# Patient Record
Sex: MALE | Race: WHITE | NOT HISPANIC OR LATINO | Employment: OTHER | URBAN - METROPOLITAN AREA
[De-identification: names, ages, dates, MRNs, and addresses within clinical notes are randomized per-mention and may not be internally consistent; named-entity substitution may affect disease eponyms.]

---

## 2020-11-11 ENCOUNTER — TELEPHONE (OUTPATIENT)
Dept: ORTHOPEDICS | Facility: CLINIC | Age: 80
End: 2020-11-11

## 2020-11-11 DIAGNOSIS — R52 PAIN: Primary | ICD-10-CM

## 2020-11-11 NOTE — TELEPHONE ENCOUNTER
Left patient a voicemail stating he needs to come in 1 hour early before his appointment to have a xray of his right hand on the first floor.If he has any questions he can reach the office at [phone].

## 2020-11-11 NOTE — TELEPHONE ENCOUNTER
Tried to call call patients emergency contact so patient could come in 1 hour early before his appointment tomorrow to have xray but the number is not in service.

## 2020-11-11 NOTE — TELEPHONE ENCOUNTER
Patient returned my phone call and he will come in 1 hour early before his appointment to have his xray done.

## 2020-11-12 ENCOUNTER — HOSPITAL ENCOUNTER (OUTPATIENT)
Dept: RADIOLOGY | Facility: OTHER | Age: 80
Discharge: HOME | End: 2020-11-12
Attending: ORTHOPAEDIC SURGERY
Payer: OTHER GOVERNMENT

## 2020-11-12 ENCOUNTER — OFFICE VISIT (OUTPATIENT)
Dept: ORTHOPEDICS | Facility: CLINIC | Age: 80
End: 2020-11-12
Payer: OTHER GOVERNMENT

## 2020-11-12 VITALS
HEART RATE: 68 BPM | WEIGHT: 175 LBS | DIASTOLIC BLOOD PRESSURE: 76 MMHG | HEIGHT: 63 IN | SYSTOLIC BLOOD PRESSURE: 129 MMHG | BODY MASS INDEX: 31.01 KG/M2

## 2020-11-12 DIAGNOSIS — R52 PAIN: ICD-10-CM

## 2020-11-12 DIAGNOSIS — M65.341 TRIGGER RING FINGER OF RIGHT HAND: Primary | ICD-10-CM

## 2020-11-12 DIAGNOSIS — M65.351 TRIGGER LITTLE FINGER OF RIGHT HAND: ICD-10-CM

## 2020-11-12 PROCEDURE — 73130 X-RAY EXAM OF HAND: CPT | Mod: 26,RT,, | Performed by: RADIOLOGY

## 2020-11-12 PROCEDURE — 20550 NJX 1 TENDON SHEATH/LIGAMENT: CPT | Mod: PBBFAC | Performed by: ORTHOPAEDIC SURGERY

## 2020-11-12 PROCEDURE — 73130 X-RAY EXAM OF HAND: CPT | Mod: TC,FY,RT

## 2020-11-12 PROCEDURE — 99203 OFFICE O/P NEW LOW 30 MIN: CPT | Mod: S$PBB,25,, | Performed by: ORTHOPAEDIC SURGERY

## 2020-11-12 PROCEDURE — 99999 PR PBB SHADOW E&M-EST. PATIENT-LVL III: CPT | Mod: PBBFAC,,, | Performed by: ORTHOPAEDIC SURGERY

## 2020-11-12 PROCEDURE — 99203 PR OFFICE/OUTPT VISIT, NEW, LEVL III, 30-44 MIN: ICD-10-PCS | Mod: S$PBB,25,, | Performed by: ORTHOPAEDIC SURGERY

## 2020-11-12 PROCEDURE — 99213 OFFICE O/P EST LOW 20 MIN: CPT | Mod: PBBFAC,25 | Performed by: ORTHOPAEDIC SURGERY

## 2020-11-12 PROCEDURE — 20550 TENDON SHEATH: R RING MCP: ICD-10-PCS | Mod: S$PBB,F8,, | Performed by: ORTHOPAEDIC SURGERY

## 2020-11-12 PROCEDURE — 73130 XR HAND COMPLETE 3 VIEW RIGHT: ICD-10-PCS | Mod: 26,RT,, | Performed by: RADIOLOGY

## 2020-11-12 PROCEDURE — 99999 PR PBB SHADOW E&M-EST. PATIENT-LVL III: ICD-10-PCS | Mod: PBBFAC,,, | Performed by: ORTHOPAEDIC SURGERY

## 2020-11-12 RX ORDER — ROSUVASTATIN CALCIUM 10 MG/1
TABLET, COATED ORAL
COMMUNITY
Start: 2020-05-08 | End: 2021-10-12

## 2020-11-12 RX ORDER — AMMONIUM LACTATE 12 G/100G
LOTION TOPICAL
COMMUNITY
Start: 2020-10-26

## 2020-11-12 RX ORDER — ALOGLIPTIN 12.5 MG/1
TABLET, FILM COATED ORAL
COMMUNITY
Start: 2020-05-05

## 2020-11-12 RX ORDER — DEXAMETHASONE SODIUM PHOSPHATE 4 MG/ML
4 INJECTION, SOLUTION INTRA-ARTICULAR; INTRALESIONAL; INTRAMUSCULAR; INTRAVENOUS; SOFT TISSUE
Status: DISCONTINUED | OUTPATIENT
Start: 2020-11-12 | End: 2020-11-12 | Stop reason: HOSPADM

## 2020-11-12 RX ORDER — INSULIN GLARGINE 100 [IU]/ML
INJECTION, SOLUTION SUBCUTANEOUS
COMMUNITY
Start: 2020-05-08

## 2020-11-12 RX ORDER — CALCIPOTRIENE 50 UG/G
CREAM TOPICAL
COMMUNITY
Start: 2020-10-26

## 2020-11-12 RX ORDER — FLUOROURACIL 50 MG/G
CREAM TOPICAL
COMMUNITY
Start: 2020-10-26

## 2020-11-12 RX ADMIN — DEXAMETHASONE SODIUM PHOSPHATE 4 MG: 4 INJECTION INTRA-ARTICULAR; INTRALESIONAL; INTRAMUSCULAR; INTRAVENOUS; SOFT TISSUE at 09:11

## 2020-11-12 NOTE — PROCEDURES
Tendon Sheath: R ring MCP    Date/Time: 11/12/2020 9:15 AM  Performed by: Angi Saleh MD  Authorized by: Angi Saleh MD     Consent Done?:  Yes (Verbal)  Indications:  Pain  Timeout: prior to procedure the correct patient, procedure, and site was verified    Prep: patient was prepped and draped in usual sterile fashion      Local anesthesia used?: Yes    Anesthesia:  Local infiltration  Local anesthetic:  Lidocaine 1% without epinephrine  Location:  Ring finger  Site:  R ring MCP  Needle size:  25 G  Approach:  Volar  Medications:  4 mg dexamethasone 4 mg/mL

## 2020-11-12 NOTE — PROGRESS NOTES
I have personally taken the history and examined this patient. I agree with the resident's note as stated above.  Plan for right ring finger trigger finger injection. Pt has almost a mallet finger on the ring as well- + nodule tendon  + DM  Pt cannot make a full fist

## 2020-11-12 NOTE — LETTER
November 12, 2020      Connie Mendoza MD  [address] [address]           [place] [address]  [address] [facility] [address]  [facility] [address]  Phone: [phone]          Patient: Syd Butterfield   MR Number: [id number]   YOB: 1940   Date of Visit: 11/12/2020       Dear Dr. Connie Mendoza:    Thank you for referring Syd Butterfield to me for evaluation. Attached you will find relevant portions of my assessment and plan of care.    If you have questions, please do not hesitate to call me. I look forward to following Syd Butterfield along with you.    Sincerely,    Angi Saleh MD    Enclosure  CC:  No Recipients    If you would like to receive this communication electronically, please contact [email or web address] or [phone] to request more information on [employer] Link access.    For providers and/or their staff who would like to refer a patient to [facility], please contact us through our one-stop-shop provider referral line, [facility], at [phone].    If you feel you have received this communication in error or would no longer like to receive these types of communications, please e-mail [email or web address]

## 2020-11-12 NOTE — PROGRESS NOTES
DATE: 11/12/2020  PATIENT: Syd Butterfield    CHIEF COMPLAINT: R ring and small finger triggering    HPI:  80M presents with R ring and small finger triggering.  Present for 6 months.  No injury.  Does not have pain.  Has not tried bracing, CSI, surgery.  Worst in AM.  Also has multiple finger deformities that are chronic and not painful.  R thumb IPJ does not move.    PMH: IDDM, psoriasis, HLD    PSH: none    FH: none pertinent    Social History     Socioeconomic History    Marital status:      Spouse name: Not on file    Number of children: Not on file    Years of education: Not on file    Highest education level: Not on file   Occupational History    Not on file   Social Needs    Financial resource strain: Not on file    Food insecurity     Worry: Not on file     Inability: Not on file    Transportation needs     Medical: Not on file     Non-medical: Not on file   Tobacco Use    Smoking status: Not on file   Substance and Sexual Activity    Alcohol use: Not on file    Drug use: Not on file    Sexual activity: Not on file   Lifestyle    Physical activity     Days per week: Not on file     Minutes per session: Not on file    Stress: Not on file   Relationships    Social connections     Talks on phone: Not on file     Gets together: Not on file     Attends Orthodox service: Not on file     Active member of club or organization: Not on file     Attends meetings of clubs or organizations: Not on file     Relationship status: Not on file   Other Topics Concern    Not on file   Social History Narrative    Not on file         Current Outpatient Medications:     alogliptin (NESINA) 12.5 mg Tab, TAKE ONE TABLET BY MOUTH ONCE DAILY FOR DIABETES, Disp: , Rfl:     ammonium lactate (LAC-HYDRIN) 12 % lotion, APPLY LOTION TOPICALLY TWICE A DAY AS NEEDED APPLY TO AFFECTED AREA, Disp: , Rfl:     calcipotriene (DOVONOX) 0.005 % cream, APPLY SMALL AMOUNT TOPICALLY TWICE A DAY FOR PSORIASIS  APPLY A SMALL  "AMOUNT WITH EFFUDEX CREAM TWICE EVERY DAY FOR 5-7 DAYS. START  IN 4 WEEKS. FOR PSORIASIS  APPLY A SMALL AMOUNT WITH EFFUDEX CREAM TWICE EVERY DAY FOR 5-7 DAYS. START   IN 4 WEEKS., Disp: , Rfl:     fluorouraciL (EFUDEX) 5 % cream, APPLY SMALL AMOUNT TOPICALLY TWICE A DAY APPLY TO TREATMENT AREA WITH CALCIPOTRIENE TWICE PER DAY FOR FIVE TO  SEVEN DAYS.  TAKE ONE TO TWO WEEKS OFF BEFORE TREATING ANOTHER AREA. START IN 4 WEEKS. APPLY TO TREATMENT AREA WITH CALCIPOTRIENE TWICE PER DAY FOR FIVE TO   SEVEN DAYS.  TAKE ONE TO TWO WEEKS OFF BEFORE TREATING ANOTHER AREA. START IN 4 WEEKS., Disp: , Rfl:     insulin glargine 100 units/mL (3mL) SubQ pen, INJECT 18 UNTIS SUBCUTANEOUSLY AT BEDTIME FOR DIABETES, Disp: , Rfl:     rosuvastatin (CRESTOR) 10 MG tablet, TAKE ONE-HALF TABLET BY MOUTH EVERY DAY FOR CHOLESTEROL, Disp: , Rfl:     Review of patient's allergies indicates:   Allergen Reactions    Aspirin      Other reaction(s): Ecchymosis    Atorvastatin      Other reaction(s): Muscle pain    Contrast media     Fish oil      Other reaction(s): Ecchymosis    Lisinopril      Other reaction(s): Low blood pressure    Lortab 5-325  [hydrocodone-acetaminophen]     Simvastatin      Other reaction(s): Muscle pain       REVIEW OF SYSTEMS:  Constitutional: negative for fevers  Musculoskeletal: negative for paresthesias    PHYSICAL EXAMINATION:    /76   Pulse 68   Ht 5' 3" (1.6 m)   Wt 79.4 kg (175 lb)   BMI 31.00 kg/m²     General: No acute distress.  Cardio: Regular rate.  Chest: No increased work of breathing.     MSK:  R hand exam:    No discoloration  No scars  No wounds  No swelling  No thenar atrophy  No interossei atrophy  Multiple enlarged finger joints    Palpable nodule over ring and small A1 pulleys with visible and palpable locking  No wrist effusion  Warm, well perfused    Fingers flex to distal palmar crease except ring and small finger  Thumb opposes to base of small finger  No pain with active wrist " flexion/extension    SILT and motor intact M/R/U  Radial pulse palpable  Jalen's test shows complete palmar arch    Tinel's test negative  Phalen's test negative  No Froment's sign  No Wartenberg's sign    FPL intact  FDP/FDS intact to all fingers    Grind test negative  Finkelstein's negative          IMAGING:     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    ASSESSMENT/PLAN:    80M with R ring and small finger TFs.  Discussed options.  He would like CSI.  Will do ring finger today since it is worst.  Only one CSI since DM not controlled.  Night bracing.  F/u 6 weeks for reevaluation.

## 2020-11-18 ENCOUNTER — LAB VISIT (OUTPATIENT)
Dept: PRIMARY CARE CLINIC | Facility: OTHER | Age: 80
End: 2020-11-18
Attending: INTERNAL MEDICINE
Payer: OTHER GOVERNMENT

## 2020-11-18 DIAGNOSIS — Z03.818 ENCOUNTER FOR OBSERVATION FOR SUSPECTED EXPOSURE TO OTHER BIOLOGICAL AGENTS RULED OUT: ICD-10-CM

## 2020-11-18 PROCEDURE — U0003 INFECTIOUS AGENT DETECTION BY NUCLEIC ACID (DNA OR RNA); SEVERE ACUTE RESPIRATORY SYNDROME CORONAVIRUS 2 (SARS-COV-2) (CORONAVIRUS DISEASE [COVID-19]), AMPLIFIED PROBE TECHNIQUE, MAKING USE OF HIGH THROUGHPUT TECHNOLOGIES AS DESCRIBED BY CMS-2020-01-R: HCPCS

## 2020-11-20 ENCOUNTER — TELEPHONE (OUTPATIENT)
Dept: ORTHOPEDICS | Facility: CLINIC | Age: 80
End: 2020-11-20

## 2020-11-20 NOTE — TELEPHONE ENCOUNTER
Left patient a voicemail letting the patient  know the provider is unavailable on 12/29/20.Please call the office back to reschedule at [phone].

## 2020-11-21 LAB — SARS-COV-2 RNA RESP QL NAA+PROBE: NORMAL

## 2020-12-29 ENCOUNTER — OFFICE VISIT (OUTPATIENT)
Dept: ORTHOPEDICS | Facility: CLINIC | Age: 80
End: 2020-12-29
Payer: OTHER GOVERNMENT

## 2020-12-29 VITALS
HEART RATE: 69 BPM | BODY MASS INDEX: 31.01 KG/M2 | DIASTOLIC BLOOD PRESSURE: 79 MMHG | WEIGHT: 175 LBS | SYSTOLIC BLOOD PRESSURE: 163 MMHG | HEIGHT: 63 IN

## 2020-12-29 DIAGNOSIS — M19.049 HAND ARTHRITIS: Primary | ICD-10-CM

## 2020-12-29 PROCEDURE — 99213 OFFICE O/P EST LOW 20 MIN: CPT | Mod: PBBFAC | Performed by: PHYSICIAN ASSISTANT

## 2020-12-29 PROCEDURE — 99999 PR PBB SHADOW E&M-EST. PATIENT-LVL III: CPT | Mod: PBBFAC,,, | Performed by: PHYSICIAN ASSISTANT

## 2020-12-29 PROCEDURE — 99213 PR OFFICE/OUTPT VISIT, EST, LEVL III, 20-29 MIN: ICD-10-PCS | Mod: S$PBB,,, | Performed by: PHYSICIAN ASSISTANT

## 2020-12-29 PROCEDURE — 99213 OFFICE O/P EST LOW 20 MIN: CPT | Mod: S$PBB,,, | Performed by: PHYSICIAN ASSISTANT

## 2020-12-29 PROCEDURE — 99999 PR PBB SHADOW E&M-EST. PATIENT-LVL III: ICD-10-PCS | Mod: PBBFAC,,, | Performed by: PHYSICIAN ASSISTANT

## 2020-12-29 NOTE — PROGRESS NOTES
DATE: 12/29/2020  PATIENT: Syd Butterfield    CHIEF COMPLAINT: R ring and small finger triggering    HPI:  80M presents with R ring and small finger triggering.  Present for 6 months.  No injury.  Does not have pain.  Has not tried bracing, CSI, surgery.  Worst in AM.  Also has multiple finger deformities that are chronic and not painful.  R thumb IPJ does not move.    12/29/20  Pt presents for follow up right small and ring trigger fingers. Injections performed at last visit. He reports resolution of triggering, denies pain. He states he lacks a few degrees of full flexion of the ring. He also reports he feels his  strength has decreased.     PMH: IDDM, psoriasis, HLD    PSH: none    FH: none pertinent    Social History     Socioeconomic History    Marital status:      Spouse name: Not on file    Number of children: Not on file    Years of education: Not on file    Highest education level: Not on file   Occupational History    Not on file   Social Needs    Financial resource strain: Not on file    Food insecurity     Worry: Not on file     Inability: Not on file    Transportation needs     Medical: Not on file     Non-medical: Not on file   Tobacco Use    Smoking status: Never Smoker    Smokeless tobacco: Never Used   Substance and Sexual Activity    Alcohol use: Not on file    Drug use: Not on file    Sexual activity: Not on file   Lifestyle    Physical activity     Days per week: Not on file     Minutes per session: Not on file    Stress: Not on file   Relationships    Social connections     Talks on phone: Not on file     Gets together: Not on file     Attends Yazdanism service: Not on file     Active member of club or organization: Not on file     Attends meetings of clubs or organizations: Not on file     Relationship status: Not on file   Other Topics Concern    Not on file   Social History Narrative    Not on file         Current Outpatient Medications:     alogliptin (NESINA) 12.5  "mg Tab, TAKE ONE TABLET BY MOUTH ONCE DAILY FOR DIABETES, Disp: , Rfl:     ammonium lactate (LAC-HYDRIN) 12 % lotion, APPLY LOTION TOPICALLY TWICE A DAY AS NEEDED APPLY TO AFFECTED AREA, Disp: , Rfl:     calcipotriene (DOVONOX) 0.005 % cream, APPLY SMALL AMOUNT TOPICALLY TWICE A DAY FOR PSORIASIS  APPLY A SMALL AMOUNT WITH EFFUDEX CREAM TWICE EVERY DAY FOR 5-7 DAYS. START  IN 4 WEEKS. FOR PSORIASIS  APPLY A SMALL AMOUNT WITH EFFUDEX CREAM TWICE EVERY DAY FOR 5-7 DAYS. START   IN 4 WEEKS., Disp: , Rfl:     fluorouraciL (EFUDEX) 5 % cream, APPLY SMALL AMOUNT TOPICALLY TWICE A DAY APPLY TO TREATMENT AREA WITH CALCIPOTRIENE TWICE PER DAY FOR FIVE TO  SEVEN DAYS.  TAKE ONE TO TWO WEEKS OFF BEFORE TREATING ANOTHER AREA. START IN 4 WEEKS. APPLY TO TREATMENT AREA WITH CALCIPOTRIENE TWICE PER DAY FOR FIVE TO   SEVEN DAYS.  TAKE ONE TO TWO WEEKS OFF BEFORE TREATING ANOTHER AREA. START IN 4 WEEKS., Disp: , Rfl:     insulin glargine 100 units/mL (3mL) SubQ pen, INJECT 18 UNTIS SUBCUTANEOUSLY AT BEDTIME FOR DIABETES, Disp: , Rfl:     rosuvastatin (CRESTOR) 10 MG tablet, TAKE ONE-HALF TABLET BY MOUTH EVERY DAY FOR CHOLESTEROL, Disp: , Rfl:     Review of patient's allergies indicates:   Allergen Reactions    Aspirin      Other reaction(s): Ecchymosis    Atorvastatin      Other reaction(s): Muscle pain    Contrast media     Fish oil      Other reaction(s): Ecchymosis    Lisinopril      Other reaction(s): Low blood pressure    Lortab 5-325  [hydrocodone-acetaminophen]     Simvastatin      Other reaction(s): Muscle pain       REVIEW OF SYSTEMS:  Constitutional: negative for fevers  Musculoskeletal: negative for paresthesias    PHYSICAL EXAMINATION:    BP (!) 163/79 (BP Location: Left arm, Patient Position: Sitting, BP Method: Large (Automatic))   Pulse 69   Ht 5' 3" (1.6 m)   Wt 79.4 kg (175 lb)   BMI 31.00 kg/m²     General: No acute distress.  Cardio: Regular rate.  Chest: No increased work of breathing.     MSK:  R " hand exam:  Good wrist/ finger motion. Palpable nodule over ring A1 pulley with no ttp or triggering. He lacks a few degrees of full PIP flexion, chronic extensor lag at the DIP. Palpable radial pulse. Normal capillary refill. NVI. No wounds.       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    ASSESSMENT/PLAN:    80M with R ring trigger finger improved s/p injection. He does still have slightly decreased full flexion. Discussed arthritis and its effect on ROM. Will order OT. RTC if needed.

## 2021-01-06 ENCOUNTER — CLINICAL SUPPORT (OUTPATIENT)
Dept: REHABILITATION | Facility: HOSPITAL | Age: 81
End: 2021-01-06
Payer: OTHER GOVERNMENT

## 2021-01-06 DIAGNOSIS — M19.049 HAND ARTHRITIS: ICD-10-CM

## 2021-01-06 PROCEDURE — 97165 OT EVAL LOW COMPLEX 30 MIN: CPT

## 2021-01-06 PROCEDURE — 97110 THERAPEUTIC EXERCISES: CPT

## 2021-05-12 ENCOUNTER — PATIENT MESSAGE (OUTPATIENT)
Dept: RESEARCH | Facility: HOSPITAL | Age: 81
End: 2021-05-12

## 2021-07-12 RX ORDER — INSULIN ASPART 100 [IU]/ML
INJECTION, SOLUTION INTRAVENOUS; SUBCUTANEOUS
COMMUNITY
Start: 2020-05-08

## 2021-07-12 RX ORDER — DICLOFENAC SODIUM 10 MG/G
GEL TOPICAL
COMMUNITY
Start: 2020-11-23

## 2021-07-12 RX ORDER — HYDROCHLOROTHIAZIDE 25 MG/1
TABLET ORAL
COMMUNITY
Start: 2020-11-23

## 2021-07-12 RX ORDER — METFORMIN HYDROCHLORIDE 1000 MG/1
TABLET ORAL
COMMUNITY
Start: 2020-11-23 | End: 2021-10-12

## 2021-07-14 ENCOUNTER — HOSPITAL ENCOUNTER (OUTPATIENT)
Dept: RADIOLOGY | Facility: CLINIC | Age: 81
Discharge: HOME | End: 2021-07-14
Attending: PODIATRIST
Payer: OTHER GOVERNMENT

## 2021-07-14 ENCOUNTER — OFFICE VISIT (OUTPATIENT)
Dept: PODIATRY | Facility: CLINIC | Age: 81
End: 2021-07-14
Payer: OTHER GOVERNMENT

## 2021-07-14 VITALS
RESPIRATION RATE: 16 BRPM | BODY MASS INDEX: 34.02 KG/M2 | OXYGEN SATURATION: 98 % | HEART RATE: 70 BPM | HEIGHT: 63 IN | WEIGHT: 192 LBS

## 2021-07-14 DIAGNOSIS — E11.42 DIABETIC POLYNEUROPATHY ASSOCIATED WITH TYPE 2 DIABETES MELLITUS: Primary | ICD-10-CM

## 2021-07-14 DIAGNOSIS — E11.610 CHARCOT'S ARTHROPATHY, DIABETIC: ICD-10-CM

## 2021-07-14 DIAGNOSIS — M79.671 RIGHT FOOT PAIN: ICD-10-CM

## 2021-07-14 DIAGNOSIS — M20.5X1 HALLUX LIMITUS OF RIGHT FOOT: ICD-10-CM

## 2021-07-14 PROCEDURE — 73630 XR FOOT COMPLETE 3 VIEW RIGHT: ICD-10-PCS | Mod: RT,S$GLB,, | Performed by: RADIOLOGY

## 2021-07-14 PROCEDURE — 73630 X-RAY EXAM OF FOOT: CPT | Mod: RT,S$GLB,, | Performed by: RADIOLOGY

## 2021-07-14 PROCEDURE — 99204 OFFICE O/P NEW MOD 45 MIN: CPT | Mod: S$GLB,,, | Performed by: PODIATRIST

## 2021-07-14 PROCEDURE — 99204 PR OFFICE/OUTPT VISIT, NEW, LEVL IV, 45-59 MIN: ICD-10-PCS | Mod: S$GLB,,, | Performed by: PODIATRIST

## 2021-10-12 ENCOUNTER — OFFICE VISIT (OUTPATIENT)
Dept: PODIATRY | Facility: CLINIC | Age: 81
End: 2021-10-12
Payer: OTHER GOVERNMENT

## 2021-10-12 VITALS — BODY MASS INDEX: 34.02 KG/M2 | WEIGHT: 192 LBS | RESPIRATION RATE: 16 BRPM | HEIGHT: 63 IN

## 2021-10-12 DIAGNOSIS — M21.41 PES PLANUS OF RIGHT FOOT: ICD-10-CM

## 2021-10-12 DIAGNOSIS — M20.5X1 HALLUX LIMITUS OF RIGHT FOOT: ICD-10-CM

## 2021-10-12 DIAGNOSIS — I87.2 VENOUS INSUFFICIENCY OF BOTH LOWER EXTREMITIES: ICD-10-CM

## 2021-10-12 DIAGNOSIS — E11.42 DIABETIC POLYNEUROPATHY ASSOCIATED WITH TYPE 2 DIABETES MELLITUS: Primary | ICD-10-CM

## 2021-10-12 DIAGNOSIS — E11.610 CHARCOT'S ARTHROPATHY, DIABETIC: ICD-10-CM

## 2021-10-12 DIAGNOSIS — M79.671 RIGHT FOOT PAIN: ICD-10-CM

## 2021-10-12 PROCEDURE — 99214 OFFICE O/P EST MOD 30 MIN: CPT | Mod: S$GLB,,, | Performed by: PODIATRIST

## 2021-10-12 PROCEDURE — 99214 PR OFFICE/OUTPT VISIT, EST, LEVL IV, 30-39 MIN: ICD-10-PCS | Mod: S$GLB,,, | Performed by: PODIATRIST

## 2022-04-19 ENCOUNTER — TELEPHONE (OUTPATIENT)
Dept: PODIATRY | Facility: CLINIC | Age: 82
End: 2022-04-19
Payer: OTHER GOVERNMENT

## 2022-04-27 ENCOUNTER — TELEPHONE (OUTPATIENT)
Dept: PODIATRY | Facility: CLINIC | Age: 82
End: 2022-04-27
Payer: OTHER GOVERNMENT

## 2022-04-27 NOTE — TELEPHONE ENCOUNTER
----- Message from Michael Nolan sent at 4/27/2022  4:50 PM CDT -----  Regarding: Appt  Contact: KAREN GARZA [[id number]]  Name of Who is Calling: KAREN GARZA [[id number]]      What is the request in detail: Would like to speak with staff in regards to an appointment next week, [facility]. Please advise      Can the clinic reply by [facility]: no      What Number to Call Back [facility] not in [facility]: [phone] (please leave message)

## 2022-04-27 NOTE — TELEPHONE ENCOUNTER
Staff tried to contact patient to inform him that a referral has not been sent to Podiatry.    Staff left a message on voice mail informing the patient to have the [facility] referral to [phone].

## 2022-04-28 ENCOUNTER — TELEPHONE (OUTPATIENT)
Dept: PODIATRY | Facility: CLINIC | Age: 82
End: 2022-04-28
Payer: OTHER GOVERNMENT

## 2022-04-28 NOTE — TELEPHONE ENCOUNTER
Spoke with patient to inform him that a referral from the [facility] was not received. Staff inform patient that he could call the [facility] and fax the referral over. Staff gave patient fax number for [facility] to [facility] paperwork over.    Patient communicated understanding and will reach out when he get more information.    Staff verbalized understanding.

## 2022-04-28 NOTE — TELEPHONE ENCOUNTER
Patient stated that [facility] the [facility] and they are processing the request.    Staff communicated understanding and will reach out to schedule appointment when the referral is received.    Patient verbalized understanding.        ----- Message from Lisa Yusuf sent at 4/28/2022 10:46 AM CDT -----  Regarding: Requesting a call back  Contact: KAREN AGRZA [[id number]]  Name of Who is Calling:KAREN GARZA [[id number]]          What is the request in detail: Pt states [facility] and states the requested was being processed. Please advise          Can the clinic reply by [facility]: No          What Number to [facility] Back [facility]:[phone]

## 2022-05-10 ENCOUNTER — OFFICE VISIT (OUTPATIENT)
Dept: PODIATRY | Facility: CLINIC | Age: 82
End: 2022-05-10
Payer: OTHER GOVERNMENT

## 2022-05-10 ENCOUNTER — APPOINTMENT (OUTPATIENT)
Dept: RADIOLOGY | Facility: OTHER | Age: 82
End: 2022-05-10
Attending: PODIATRIST
Payer: OTHER GOVERNMENT

## 2022-05-10 VITALS
BODY MASS INDEX: 34.02 KG/M2 | WEIGHT: 192 LBS | DIASTOLIC BLOOD PRESSURE: 78 MMHG | SYSTOLIC BLOOD PRESSURE: 159 MMHG | HEIGHT: 63 IN | HEART RATE: 72 BPM

## 2022-05-10 DIAGNOSIS — E11.610 CHARCOT'S ARTHROPATHY, DIABETIC: ICD-10-CM

## 2022-05-10 DIAGNOSIS — E11.42 DIABETIC POLYNEUROPATHY ASSOCIATED WITH TYPE 2 DIABETES MELLITUS: Primary | ICD-10-CM

## 2022-05-10 DIAGNOSIS — E11.42 DIABETIC POLYNEUROPATHY ASSOCIATED WITH TYPE 2 DIABETES MELLITUS: ICD-10-CM

## 2022-05-10 DIAGNOSIS — M20.12 VALGUS DEFORMITY OF BOTH GREAT TOES: ICD-10-CM

## 2022-05-10 DIAGNOSIS — M20.11 VALGUS DEFORMITY OF BOTH GREAT TOES: ICD-10-CM

## 2022-05-10 PROCEDURE — 99999 PR PBB SHADOW E&M-EST. PATIENT-LVL IV: ICD-10-PCS | Mod: PBBFAC,,, | Performed by: PODIATRIST

## 2022-05-10 PROCEDURE — 73630 XR FOOT COMPLETE 3 VIEW BILATERAL: ICD-10-PCS | Mod: 26,50,, | Performed by: RADIOLOGY

## 2022-05-10 PROCEDURE — 11721 DEBRIDE NAIL 6 OR MORE: CPT | Mod: S$PBB,,, | Performed by: PODIATRIST

## 2022-05-10 PROCEDURE — 99214 PR OFFICE/OUTPT VISIT, EST, LEVL IV, 30-39 MIN: ICD-10-PCS | Mod: 25,S$PBB,, | Performed by: PODIATRIST

## 2022-05-10 PROCEDURE — 11721 PR DEBRIDEMENT OF NAILS, 6 OR MORE: ICD-10-PCS | Mod: S$PBB,,, | Performed by: PODIATRIST

## 2022-05-10 PROCEDURE — 73630 X-RAY EXAM OF FOOT: CPT | Mod: 26,50,, | Performed by: RADIOLOGY

## 2022-05-10 PROCEDURE — 99214 OFFICE O/P EST MOD 30 MIN: CPT | Mod: 25,S$PBB,, | Performed by: PODIATRIST

## 2022-05-10 PROCEDURE — 99999 PR PBB SHADOW E&M-EST. PATIENT-LVL IV: CPT | Mod: PBBFAC,,, | Performed by: PODIATRIST

## 2022-05-10 PROCEDURE — 99214 OFFICE O/P EST MOD 30 MIN: CPT | Mod: PBBFAC,PN,25 | Performed by: PODIATRIST

## 2022-05-10 PROCEDURE — 73630 X-RAY EXAM OF FOOT: CPT | Mod: TC,50

## 2022-05-10 PROCEDURE — 11721 DEBRIDE NAIL 6 OR MORE: CPT | Mod: Q9,PBBFAC,PN | Performed by: PODIATRIST

## 2022-05-10 RX ORDER — CICLOPIROX 80 MG/ML
SOLUTION TOPICAL NIGHTLY
Qty: 6.6 ML | Refills: 11 | Status: SHIPPED | OUTPATIENT
Start: 2022-05-10

## 2022-05-10 NOTE — PROGRESS NOTES
Subjective:      Patient ID: Syd Butterfield Jr. is a 82 y.o. male.    Chief Complaint: Foot Problem (Charcot R foot)    Syd is a 82 y.o. male who presents to the clinic for evaluation and treatment of high risk feet. 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 The patient's chief complaint is long, thick toenails. Gradual onset, worsening over past several weeks, aggravated by increased weight bearing, shoe gear, pressure.  Periodic debridement help symptoms. .    [place] [facility] [facility] [place] [facility]    Date Last Seen by PCP:   Chief Complaint   Patient presents with    Foot Problem     Charcot R foot         Current shoe gear:  Affected Foot: Casual shoes     Unaffected Foot: Casual shoes    No results found for: HGBA1C    Review of Systems   Constitutional: Negative for chills, diaphoresis, fever, malaise/fatigue and night sweats.   Cardiovascular: Negative for claudication, cyanosis, leg swelling and syncope.   Skin: Positive for nail changes. Negative for color change, dry skin, rash, suspicious lesions and unusual hair distribution.   Musculoskeletal: Positive for joint pain and joint swelling. Negative for falls, muscle cramps, muscle weakness and stiffness.   Gastrointestinal: Negative for constipation, diarrhea, nausea and vomiting.   Neurological: Positive for numbness, paresthesias and sensory change. Negative for brief paralysis, disturbances in coordination, focal weakness and tremors.           Objective:      Physical Exam  Constitutional:        General: He is not in acute distress.     Appearance: He is well-developed. He is not diaphoretic.   Cardiovascular:      Pulses:           Popliteal pulses are 2+ on the right side and 2+ on the left side.        Dorsalis pedis pulses are 2+ on the right side and 2+ on the left side.        Posterior tibial pulses are 1+ on the right side and 1+ on the left side.      Comments: Capillary refill 3 seconds all toes/distal feet, all toes/both feet warm to touch.      Negative lymphadenopathy bilateral popliteal fossa and tarsal tunnel.      Negavie lower extremity edema bilateral.    Musculoskeletal:      Right ankle: No swelling, deformity, ecchymosis or lacerations. Normal range of motion. Normal pulse.      Right Achilles Tendon: Normal. No defects. Jacinto's test negative.      Comments: Mild hallux valgus partially reducible bilateral without symptoms today.    Patient has minimal plantar lateral midfoot rocker bottom on the right secondary to Charcot which is now quiescent.    Otherwise, Normal angle, base, station of gait. All ten toes without clubbing, cyanosis, or signs of ischemia.  No pain to palpation bilateral lower extremities.  Range of motion, stability, muscle strength, and muscle tone normal bilateral feet and legs.    Lymphadenopathy:      Lower Body: No right inguinal adenopathy. No left inguinal adenopathy.      Comments: Negative lymphadenopathy bilateral popliteal fossa and tarsal tunnel.    Negative lymphangitic streaking bilateral feet/ankles/legs.   Skin:     General: Skin is warm and dry.      Capillary Refill: Capillary refill takes 2 to 3 seconds.      Coloration: Skin is not pale.      Findings: No abrasion, bruising, burn, ecchymosis, erythema, laceration, lesion or rash.      Nails: There is no clubbing.      Comments:   Skin is normal age and health appropriate color, turgor, texture, and temperature bilateral lower extremities without ulceration, hyperpigmentation,  discoloration, masses nodules or cords palpated.  No ecchymosis, erythema, edema, or cardinal signs of infection bilateral lower extremities.    Toenails 1st, 2nd, 3rd, 4th, 5th  bilateral are hypertrophic thickened 2-3 mm, dystrophic, discolored tanish brown with tan, gray crumbly subungual debris.  Tender to distal nail plate pressure, without periungual skin abnormality of each.     Neurological:      Mental Status: He is alert and oriented to person, place, and time.      Sensory: Sensory deficit present.      Motor: No tremor, atrophy or abnormal muscle tone.      Gait: Gait normal.      Deep Tendon Reflexes:      Reflex Scores:       Patellar reflexes are 2+ on the right side and 2+ on the left side.       Achilles reflexes are 2+ on the right side and 2+ on the left side.     Comments: Negative tinel sign to percussion sural, superficial peroneal, deep peroneal, saphenous, and posterior tibial nerves right and left ankles and feet.    Paresthesias, and burning bilateral feet with no clearly identified trigger or source.    Decreased/absent vibratory sensation bilateral feet to 128Hz tuning fork.       Psychiatric:         Behavior: Behavior is cooperative.               Assessment:       Encounter Diagnoses   Name Primary?    Diabetic polyneuropathy associated with type 2 diabetes mellitus Yes    Charcot's arthropathy, diabetic     Valgus deformity of both great toes          Plan:       Syd was seen today for foot problem.    Diagnoses and all orders for this visit:    Diabetic polyneuropathy associated with type 2 diabetes mellitus  -     X-Ray Foot Complete Bilateral; Future  -     DIABETIC SHOES FOR HOME USE    Charcot's arthropathy, diabetic  -     X-Ray Foot Complete Bilateral; Future  -     DIABETIC SHOES FOR HOME USE    Valgus deformity of both great toes  -     X-Ray Foot Complete Bilateral; Future  -     DIABETIC SHOES FOR HOME USE      I counseled the patient on his conditions, their  implications and medical management.        - Shoe inspection. Diabetic Foot Education. Patient reminded of the importance of good nutrition and blood sugar control to help prevent podiatric complications of diabetes. Patient instructed on proper foot hygeine. We discussed wearing proper shoe gear, daily foot inspections, never walking without protective shoe gear, never putting sharp instruments to feet, routine podiatric visits at least annually.      - With patient's permission, nails were aggressively reduced and debrided x 10 to their soft tissue attachment mechanically and with electric , removing all offending nail and debris. Patient relates relief following the procedure. He will continue to monitor the areas daily, inspect his feet, wear protective shoe gear when ambulatory, moisturizer to maintain skin integrity and follow in this office p.r.n.      Discussed conservative treatment with shoes of adequate dimensions, material, and style to alleviate symptoms and delay or prevent surgical intervention.    Rx DM shoes, xray right, penlac  No follow-ups on file.

## 2024-04-11 ENCOUNTER — HOSPITAL ENCOUNTER (INPATIENT)
Facility: HOSPITAL | Age: 84
LOS: 2 days | Discharge: HOME | DRG: 328 | End: 2024-04-14
Attending: EMERGENCY MEDICINE | Admitting: HOSPITALIST
Payer: OTHER GOVERNMENT

## 2024-04-11 DIAGNOSIS — Z46.59 ENCOUNTER FOR NASOGASTRIC (NG) TUBE PLACEMENT: ICD-10-CM

## 2024-04-11 DIAGNOSIS — K44.9 HIATAL HERNIA: ICD-10-CM

## 2024-04-11 DIAGNOSIS — R11.2 NAUSEA AND VOMITING, UNSPECIFIED VOMITING TYPE: ICD-10-CM

## 2024-04-11 DIAGNOSIS — R07.9 CHEST PAIN: ICD-10-CM

## 2024-04-11 DIAGNOSIS — R53.1 GENERALIZED WEAKNESS: ICD-10-CM

## 2024-04-11 DIAGNOSIS — E86.0 DEHYDRATION: Primary | ICD-10-CM

## 2024-04-11 DIAGNOSIS — R11.2 INTRACTABLE NAUSEA AND VOMITING: ICD-10-CM

## 2024-04-11 DIAGNOSIS — R94.31 ABNORMAL EKG: ICD-10-CM

## 2024-04-11 LAB
ALBUMIN SERPL BCP-MCNC: 3.8 G/DL (ref 3.5–5.2)
ALP SERPL-CCNC: 84 U/L (ref 55–135)
ALT SERPL W/O P-5'-P-CCNC: 15 U/L (ref 10–44)
ANION GAP SERPL CALC-SCNC: 8 MMOL/L (ref 8–16)
AST SERPL-CCNC: 14 U/L (ref 10–40)
BASOPHILS # BLD AUTO: 0.12 K/UL (ref 0–0.2)
BASOPHILS NFR BLD: 0.7 % (ref 0–1.9)
BILIRUB SERPL-MCNC: 1 MG/DL (ref 0.1–1)
BUN SERPL-MCNC: 21 MG/DL (ref 8–23)
CALCIUM SERPL-MCNC: 9.3 MG/DL (ref 8.7–10.5)
CHLORIDE SERPL-SCNC: 100 MMOL/L (ref 95–110)
CO2 SERPL-SCNC: 34 MMOL/L (ref 23–29)
CREAT SERPL-MCNC: 1 MG/DL (ref 0.5–1.4)
DIFFERENTIAL METHOD BLD: ABNORMAL
EOSINOPHIL # BLD AUTO: 0 K/UL (ref 0–0.5)
EOSINOPHIL NFR BLD: 0.1 % (ref 0–8)
ERYTHROCYTE [DISTWIDTH] IN BLOOD BY AUTOMATED COUNT: 13.1 % (ref 11.5–14.5)
EST. GFR  (NO RACE VARIABLE): >60 ML/MIN/1.73 M^2
GLUCOSE SERPL-MCNC: 169 MG/DL (ref 70–110)
HCT VFR BLD AUTO: 40.2 % (ref 40–54)
HGB BLD-MCNC: 13.5 G/DL (ref 14–18)
IMM GRANULOCYTES # BLD AUTO: 0.1 K/UL (ref 0–0.04)
IMM GRANULOCYTES NFR BLD AUTO: 0.6 % (ref 0–0.5)
LACTATE SERPL-SCNC: 2.9 MMOL/L (ref 0.5–1.9)
LIPASE SERPL-CCNC: 6 U/L (ref 4–60)
LYMPHOCYTES # BLD AUTO: 1.2 K/UL (ref 1–4.8)
LYMPHOCYTES NFR BLD: 7 % (ref 18–48)
MAGNESIUM SERPL-MCNC: 1.7 MG/DL (ref 1.6–2.6)
MCH RBC QN AUTO: 29.9 PG (ref 27–31)
MCHC RBC AUTO-ENTMCNC: 33.6 G/DL (ref 32–36)
MCV RBC AUTO: 89 FL (ref 82–98)
MONOCYTES # BLD AUTO: 0.7 K/UL (ref 0.3–1)
MONOCYTES NFR BLD: 4 % (ref 4–15)
NEUTROPHILS # BLD AUTO: 15.2 K/UL (ref 1.8–7.7)
NEUTROPHILS NFR BLD: 87.6 % (ref 38–73)
NRBC BLD-RTO: 0 /100 WBC
PHOSPHATE SERPL-MCNC: 2.4 MG/DL (ref 2.7–4.5)
PLATELET # BLD AUTO: 350 K/UL (ref 150–450)
PMV BLD AUTO: 10.5 FL (ref 9.2–12.9)
POTASSIUM SERPL-SCNC: 3.7 MMOL/L (ref 3.5–5.1)
PROT SERPL-MCNC: 6.7 G/DL (ref 6–8.4)
RBC # BLD AUTO: 4.51 M/UL (ref 4.6–6.2)
SODIUM SERPL-SCNC: 142 MMOL/L (ref 136–145)
WBC # BLD AUTO: 17.3 K/UL (ref 3.9–12.7)

## 2024-04-11 PROCEDURE — 25000003 PHARM REV CODE 250: Performed by: EMERGENCY MEDICINE

## 2024-04-11 PROCEDURE — 99285 EMERGENCY DEPT VISIT HI MDM: CPT | Mod: 25

## 2024-04-11 PROCEDURE — 93005 ELECTROCARDIOGRAM TRACING: CPT | Performed by: GENERAL PRACTICE

## 2024-04-11 PROCEDURE — 63600175 PHARM REV CODE 636 W HCPCS

## 2024-04-11 PROCEDURE — 83735 ASSAY OF MAGNESIUM: CPT | Performed by: PHYSICIAN ASSISTANT

## 2024-04-11 PROCEDURE — 85025 COMPLETE CBC W/AUTO DIFF WBC: CPT | Performed by: PHYSICIAN ASSISTANT

## 2024-04-11 PROCEDURE — 83605 ASSAY OF LACTIC ACID: CPT | Performed by: EMERGENCY MEDICINE

## 2024-04-11 PROCEDURE — 96365 THER/PROPH/DIAG IV INF INIT: CPT

## 2024-04-11 PROCEDURE — 96366 THER/PROPH/DIAG IV INF ADDON: CPT

## 2024-04-11 PROCEDURE — 25000003 PHARM REV CODE 250

## 2024-04-11 PROCEDURE — 96367 TX/PROPH/DG ADDL SEQ IV INF: CPT

## 2024-04-11 PROCEDURE — 84100 ASSAY OF PHOSPHORUS: CPT | Performed by: PHYSICIAN ASSISTANT

## 2024-04-11 PROCEDURE — 96361 HYDRATE IV INFUSION ADD-ON: CPT

## 2024-04-11 PROCEDURE — 87040 BLOOD CULTURE FOR BACTERIA: CPT | Performed by: EMERGENCY MEDICINE

## 2024-04-11 PROCEDURE — 63600175 PHARM REV CODE 636 W HCPCS: Performed by: EMERGENCY MEDICINE

## 2024-04-11 PROCEDURE — 83690 ASSAY OF LIPASE: CPT | Performed by: PHYSICIAN ASSISTANT

## 2024-04-11 PROCEDURE — 80053 COMPREHEN METABOLIC PANEL: CPT | Performed by: PHYSICIAN ASSISTANT

## 2024-04-11 PROCEDURE — 96375 TX/PRO/DX INJ NEW DRUG ADDON: CPT

## 2024-04-11 PROCEDURE — 63600175 PHARM REV CODE 636 W HCPCS: Performed by: PHYSICIAN ASSISTANT

## 2024-04-11 PROCEDURE — 36415 COLL VENOUS BLD VENIPUNCTURE: CPT | Performed by: EMERGENCY MEDICINE

## 2024-04-11 PROCEDURE — 93010 ELECTROCARDIOGRAM REPORT: CPT | Mod: ,,, | Performed by: GENERAL PRACTICE

## 2024-04-11 RX ORDER — ONDANSETRON HYDROCHLORIDE 2 MG/ML
4 INJECTION, SOLUTION INTRAVENOUS
Status: COMPLETED | OUTPATIENT
Start: 2024-04-11 | End: 2024-04-11

## 2024-04-11 RX ADMIN — SODIUM CHLORIDE, POTASSIUM CHLORIDE, SODIUM LACTATE AND CALCIUM CHLORIDE 1000 ML: 600; 310; 30; 20 INJECTION, SOLUTION INTRAVENOUS at 10:04

## 2024-04-11 RX ADMIN — PIPERACILLIN SODIUM AND TAZOBACTAM SODIUM 4.5 G: 4; .5 INJECTION, POWDER, LYOPHILIZED, FOR SOLUTION INTRAVENOUS at 09:04

## 2024-04-11 RX ADMIN — PROMETHAZINE HYDROCHLORIDE 12.5 MG: 25 INJECTION INTRAMUSCULAR; INTRAVENOUS at 05:04

## 2024-04-11 RX ADMIN — ONDANSETRON 4 MG: 2 INJECTION INTRAMUSCULAR; INTRAVENOUS at 04:04

## 2024-04-11 NOTE — FIRST PROVIDER EVALUATION
Emergency Department TeleTriage Encounter Note      CHIEF COMPLAINT    Chief Complaint   Patient presents with    Vomiting    Nausea    Weakness     Pt has been n/v on and off since April 1st. Was given zofran but no longer helping       VITAL SIGNS   Initial Vitals [04/11/24 1547]   BP Pulse Resp Temp SpO2   (!) 163/80 75 18 98.1 °F (36.7 °C) 100 %      MAP       --            ALLERGIES    Review of patient's allergies indicates:   Allergen Reactions    Aspirin      Other reaction(s): Ecchymosis    Contrast media     Crestor [rosuvastatin] Other (See Comments)     Cramping in legs at night    Fish oil      Other reaction(s): Ecchymosis    Lipitor [atorvastatin]      Other reaction(s): Muscle pain    Lisinopril      Other reaction(s): Low blood pressure    Lortab 5-325  [hydrocodone-acetaminophen]     Simvastatin      Other reaction(s): Muscle pain       PROVIDER TRIAGE NOTE  This is a teletriage evaluation of a 84 y.o. male presenting to the ED complaining of nausea, vomiting, and generalized weakness.     Initial orders will be placed and care will be transferred to an alternate provider when patient is roomed for a full evaluation. Any additional orders and the final disposition will be determined by that provider.         ORDERS  Labs Reviewed   CBC W/ AUTO DIFFERENTIAL   COMPREHENSIVE METABOLIC PANEL   LIPASE   URINALYSIS, REFLEX TO URINE CULTURE   MAGNESIUM   PHOSPHORUS       ED Orders (720h ago, onward)      Start Ordered     Status Ordering Provider    04/11/24 1600 04/11/24 1559  Magnesium  STAT         Ordered ALLYSSA ABEL    04/11/24 1600 04/11/24 1559  Phosphorus  STAT         Ordered ALLYSSA ABEL    04/11/24 1600 04/11/24 1559  ondansetron injection 4 mg  ED 1 Time         Ordered ALLYSSA ABEL    04/11/24 1559 04/11/24 1559  Saline lock IV  Once         Ordered ALLYSSA ABEL    04/11/24 1559 04/11/24 1559  CBC auto differential  STAT          Ordered DIMITRIS-ALLYSSA BLOOM E.    04/11/24 1559 04/11/24 1559  Comprehensive metabolic panel  STAT         Ordered ALLYSSA ABEL E.    04/11/24 1559 04/11/24 1559  Lipase  STAT         Ordered ALLYSSA ABEL E.    04/11/24 1559 04/11/24 1559  EKG 12-lead  Once         Ordered ALLYSSA ABEL.    04/11/24 1559 04/11/24 1559  Urinalysis, Reflex to Urine Culture Urine, Clean Catch  STAT         Ordered ALLYSSA ABEL.              Virtual Visit Note: The provider triage portion of this emergency department evaluation and documentation was performed via [employer], a HIPAA-compliant telemedicine application, in concert with a tele-presenter in the room. A face to face patient evaluation with one of my colleagues will occur once the patient is placed in an emergency department room.      DISCLAIMER: This note was prepared with [employer] voice recognition transcription software. Garbled syntax, mangled pronouns, and other bizarre constructions may be attributed to that software system.

## 2024-04-11 NOTE — Clinical Note
Diagnosis: Intractable nausea and vomiting [474096]   Future Attending Provider: CROW CAMEJO [[id number]]   Place in Observation: [facility] [[id number]]

## 2024-04-12 ENCOUNTER — ANESTHESIA EVENT (OUTPATIENT)
Dept: SURGERY | Facility: HOSPITAL | Age: 84
DRG: 328 | End: 2024-04-12
Payer: OTHER GOVERNMENT

## 2024-04-12 PROBLEM — S61.401A OPEN WOUND OF RIGHT HAND: Status: ACTIVE | Noted: 2024-04-12

## 2024-04-12 PROBLEM — E11.9 DIABETES MELLITUS: Status: ACTIVE | Noted: 2024-04-12

## 2024-04-12 PROBLEM — E83.39 HYPOPHOSPHATEMIA: Status: ACTIVE | Noted: 2024-04-12

## 2024-04-12 PROBLEM — D72.829 LEUKOCYTOSIS: Status: ACTIVE | Noted: 2024-04-12

## 2024-04-12 PROBLEM — R11.2 INTRACTABLE NAUSEA AND VOMITING: Status: ACTIVE | Noted: 2024-04-12

## 2024-04-12 LAB
ALBUMIN SERPL BCP-MCNC: 3.3 G/DL (ref 3.5–5.2)
ALP SERPL-CCNC: 75 U/L (ref 55–135)
ALT SERPL W/O P-5'-P-CCNC: 12 U/L (ref 10–44)
ANION GAP SERPL CALC-SCNC: 8 MMOL/L (ref 8–16)
AST SERPL-CCNC: 16 U/L (ref 10–40)
BACTERIA #/AREA URNS HPF: NORMAL /HPF
BASOPHILS # BLD AUTO: 0.04 K/UL (ref 0–0.2)
BASOPHILS NFR BLD: 0.3 % (ref 0–1.9)
BILIRUB SERPL-MCNC: 0.7 MG/DL (ref 0.1–1)
BILIRUB UR QL STRIP: NEGATIVE
BUN SERPL-MCNC: 20 MG/DL (ref 8–23)
CALCIUM SERPL-MCNC: 8 MG/DL (ref 8.7–10.5)
CHLORIDE SERPL-SCNC: 104 MMOL/L (ref 95–110)
CLARITY UR: ABNORMAL
CO2 SERPL-SCNC: 30 MMOL/L (ref 23–29)
COLOR UR: YELLOW
CREAT SERPL-MCNC: 1 MG/DL (ref 0.5–1.4)
DIFFERENTIAL METHOD BLD: ABNORMAL
EOSINOPHIL # BLD AUTO: 0 K/UL (ref 0–0.5)
EOSINOPHIL NFR BLD: 0 % (ref 0–8)
ERYTHROCYTE [DISTWIDTH] IN BLOOD BY AUTOMATED COUNT: 13.2 % (ref 11.5–14.5)
EST. GFR  (NO RACE VARIABLE): >60 ML/MIN/1.73 M^2
GLUCOSE SERPL-MCNC: 238 MG/DL (ref 70–110)
GLUCOSE SERPL-MCNC: 249 MG/DL (ref 70–110)
GLUCOSE SERPL-MCNC: 270 MG/DL (ref 70–110)
GLUCOSE UR QL STRIP: ABNORMAL
HCT VFR BLD AUTO: 36.7 % (ref 40–54)
HGB BLD-MCNC: 12.1 G/DL (ref 14–18)
HGB UR QL STRIP: NEGATIVE
IMM GRANULOCYTES # BLD AUTO: 0.05 K/UL (ref 0–0.04)
IMM GRANULOCYTES NFR BLD AUTO: 0.3 % (ref 0–0.5)
INR PPP: 1 (ref 0.8–1.2)
KETONES UR QL STRIP: ABNORMAL
LACTATE SERPL-SCNC: 1 MMOL/L (ref 0.5–1.9)
LACTATE SERPL-SCNC: 1.8 MMOL/L (ref 0.5–1.9)
LEUKOCYTE ESTERASE UR QL STRIP: NEGATIVE
LYMPHOCYTES # BLD AUTO: 1 K/UL (ref 1–4.8)
LYMPHOCYTES NFR BLD: 6.3 % (ref 18–48)
MAGNESIUM SERPL-MCNC: 1.8 MG/DL (ref 1.6–2.6)
MCH RBC QN AUTO: 30.2 PG (ref 27–31)
MCHC RBC AUTO-ENTMCNC: 33 G/DL (ref 32–36)
MCV RBC AUTO: 92 FL (ref 82–98)
MICROSCOPIC COMMENT: NORMAL
MONOCYTES # BLD AUTO: 0.6 K/UL (ref 0.3–1)
MONOCYTES NFR BLD: 4 % (ref 4–15)
NEUTROPHILS # BLD AUTO: 14.1 K/UL (ref 1.8–7.7)
NEUTROPHILS NFR BLD: 89.1 % (ref 38–73)
NITRITE UR QL STRIP: NEGATIVE
NRBC BLD-RTO: 0 /100 WBC
PH UR STRIP: 8 [PH] (ref 5–8)
PLATELET # BLD AUTO: 293 K/UL (ref 150–450)
PMV BLD AUTO: 10.1 FL (ref 9.2–12.9)
POCT GLUCOSE: 217 MG/DL (ref 70–110)
POTASSIUM SERPL-SCNC: 4.5 MMOL/L (ref 3.5–5.1)
PROCALCITONIN SERPL IA-MCNC: 0.06 NG/ML (ref 0–0.5)
PROT SERPL-MCNC: 5.8 G/DL (ref 6–8.4)
PROT UR QL STRIP: ABNORMAL
PROTHROMBIN TIME: 11.1 SEC (ref 9–12.5)
RBC # BLD AUTO: 4.01 M/UL (ref 4.6–6.2)
SODIUM SERPL-SCNC: 142 MMOL/L (ref 136–145)
SP GR UR STRIP: 1.01 (ref 1–1.03)
TROPONIN I SERPL HS-MCNC: 4.3 PG/ML (ref 0–14.9)
URN SPEC COLLECT METH UR: ABNORMAL
UROBILINOGEN UR STRIP-ACNC: NEGATIVE EU/DL
WBC # BLD AUTO: 15.81 K/UL (ref 3.9–12.7)
YEAST URNS QL MICRO: NORMAL

## 2024-04-12 PROCEDURE — 11000001 HC ACUTE MED/SURG PRIVATE ROOM

## 2024-04-12 PROCEDURE — C9113 INJ PANTOPRAZOLE SODIUM, VIA: HCPCS | Performed by: INTERNAL MEDICINE

## 2024-04-12 PROCEDURE — 84145 PROCALCITONIN (PCT): CPT

## 2024-04-12 PROCEDURE — 63600175 PHARM REV CODE 636 W HCPCS

## 2024-04-12 PROCEDURE — 81001 URINALYSIS AUTO W/SCOPE: CPT | Performed by: PHYSICIAN ASSISTANT

## 2024-04-12 PROCEDURE — C9113 INJ PANTOPRAZOLE SODIUM, VIA: HCPCS

## 2024-04-12 PROCEDURE — 96376 TX/PRO/DX INJ SAME DRUG ADON: CPT

## 2024-04-12 PROCEDURE — 96375 TX/PRO/DX INJ NEW DRUG ADDON: CPT

## 2024-04-12 PROCEDURE — 80053 COMPREHEN METABOLIC PANEL: CPT

## 2024-04-12 PROCEDURE — 99223 1ST HOSP IP/OBS HIGH 75: CPT | Mod: 57,,, | Performed by: STUDENT IN AN ORGANIZED HEALTH CARE EDUCATION/TRAINING PROGRAM

## 2024-04-12 PROCEDURE — 63600175 PHARM REV CODE 636 W HCPCS: Performed by: HOSPITALIST

## 2024-04-12 PROCEDURE — 25000003 PHARM REV CODE 250: Performed by: HOSPITALIST

## 2024-04-12 PROCEDURE — 96365 THER/PROPH/DIAG IV INF INIT: CPT | Mod: 59

## 2024-04-12 PROCEDURE — 83605 ASSAY OF LACTIC ACID: CPT | Mod: 91

## 2024-04-12 PROCEDURE — 36415 COLL VENOUS BLD VENIPUNCTURE: CPT | Performed by: INTERNAL MEDICINE

## 2024-04-12 PROCEDURE — 93010 ELECTROCARDIOGRAM REPORT: CPT | Mod: ,,, | Performed by: INTERNAL MEDICINE

## 2024-04-12 PROCEDURE — 83735 ASSAY OF MAGNESIUM: CPT

## 2024-04-12 PROCEDURE — 83605 ASSAY OF LACTIC ACID: CPT | Performed by: INTERNAL MEDICINE

## 2024-04-12 PROCEDURE — 82962 GLUCOSE BLOOD TEST: CPT

## 2024-04-12 PROCEDURE — 86301 IMMUNOASSAY TUMOR CA 19-9: CPT | Performed by: INTERNAL MEDICINE

## 2024-04-12 PROCEDURE — 63600175 PHARM REV CODE 636 W HCPCS: Performed by: INTERNAL MEDICINE

## 2024-04-12 PROCEDURE — 85025 COMPLETE CBC W/AUTO DIFF WBC: CPT

## 2024-04-12 PROCEDURE — 85610 PROTHROMBIN TIME: CPT | Performed by: INTERNAL MEDICINE

## 2024-04-12 PROCEDURE — 93005 ELECTROCARDIOGRAM TRACING: CPT | Performed by: INTERNAL MEDICINE

## 2024-04-12 PROCEDURE — 84484 ASSAY OF TROPONIN QUANT: CPT

## 2024-04-12 PROCEDURE — 96367 TX/PROPH/DG ADDL SEQ IV INF: CPT

## 2024-04-12 PROCEDURE — 25000003 PHARM REV CODE 250

## 2024-04-12 RX ORDER — PANTOPRAZOLE SODIUM 40 MG/10ML
40 INJECTION, POWDER, LYOPHILIZED, FOR SOLUTION INTRAVENOUS 2 TIMES DAILY
Status: DISCONTINUED | OUTPATIENT
Start: 2024-04-12 | End: 2024-04-14 | Stop reason: HOSPADM

## 2024-04-12 RX ORDER — SODIUM CHLORIDE 9 MG/ML
INJECTION, SOLUTION INTRAVENOUS CONTINUOUS
Status: DISCONTINUED | OUTPATIENT
Start: 2024-04-12 | End: 2024-04-12

## 2024-04-12 RX ORDER — SODIUM CHLORIDE 0.9 % (FLUSH) 0.9 %
10 SYRINGE (ML) INJECTION EVERY 12 HOURS PRN
Status: DISCONTINUED | OUTPATIENT
Start: 2024-04-12 | End: 2024-04-14 | Stop reason: HOSPADM

## 2024-04-12 RX ORDER — IBUPROFEN 200 MG
16 TABLET ORAL
Status: DISCONTINUED | OUTPATIENT
Start: 2024-04-12 | End: 2024-04-14 | Stop reason: HOSPADM

## 2024-04-12 RX ORDER — TALC
6 POWDER (GRAM) TOPICAL NIGHTLY PRN
Status: DISCONTINUED | OUTPATIENT
Start: 2024-04-12 | End: 2024-04-14 | Stop reason: HOSPADM

## 2024-04-12 RX ORDER — MAGNESIUM SULFATE 1 G/100ML
1 INJECTION INTRAVENOUS ONCE
Status: COMPLETED | OUTPATIENT
Start: 2024-04-12 | End: 2024-04-12

## 2024-04-12 RX ORDER — PANTOPRAZOLE SODIUM 40 MG/10ML
40 INJECTION, POWDER, LYOPHILIZED, FOR SOLUTION INTRAVENOUS ONCE
Status: COMPLETED | OUTPATIENT
Start: 2024-04-12 | End: 2024-04-12

## 2024-04-12 RX ORDER — NALOXONE HCL 0.4 MG/ML
0.02 VIAL (ML) INJECTION
Status: DISCONTINUED | OUTPATIENT
Start: 2024-04-12 | End: 2024-04-14 | Stop reason: HOSPADM

## 2024-04-12 RX ORDER — ALUMINUM HYDROXIDE, MAGNESIUM HYDROXIDE, AND SIMETHICONE 1200; 120; 1200 MG/30ML; MG/30ML; MG/30ML
30 SUSPENSION ORAL EVERY 6 HOURS PRN
Status: DISCONTINUED | OUTPATIENT
Start: 2024-04-12 | End: 2024-04-12

## 2024-04-12 RX ORDER — SODIUM CHLORIDE AND POTASSIUM CHLORIDE 150; 900 MG/100ML; MG/100ML
INJECTION, SOLUTION INTRAVENOUS CONTINUOUS
Status: DISCONTINUED | OUTPATIENT
Start: 2024-04-12 | End: 2024-04-14 | Stop reason: HOSPADM

## 2024-04-12 RX ORDER — MUPIROCIN 20 MG/G
OINTMENT TOPICAL 2 TIMES DAILY
Status: DISCONTINUED | OUTPATIENT
Start: 2024-04-12 | End: 2024-04-14 | Stop reason: HOSPADM

## 2024-04-12 RX ORDER — GLUCAGON 1 MG
1 KIT INJECTION
Status: DISCONTINUED | OUTPATIENT
Start: 2024-04-12 | End: 2024-04-14 | Stop reason: HOSPADM

## 2024-04-12 RX ORDER — ONDANSETRON HYDROCHLORIDE 2 MG/ML
4 INJECTION, SOLUTION INTRAVENOUS EVERY 4 HOURS PRN
Status: DISCONTINUED | OUTPATIENT
Start: 2024-04-12 | End: 2024-04-13

## 2024-04-12 RX ORDER — IBUPROFEN 200 MG
24 TABLET ORAL
Status: DISCONTINUED | OUTPATIENT
Start: 2024-04-12 | End: 2024-04-14 | Stop reason: HOSPADM

## 2024-04-12 RX ORDER — LIDOCAINE HYDROCHLORIDE 20 MG/ML
15 SOLUTION OROPHARYNGEAL ONCE
Status: DISCONTINUED | OUTPATIENT
Start: 2024-04-12 | End: 2024-04-12

## 2024-04-12 RX ORDER — INSULIN ASPART 100 [IU]/ML
0-5 INJECTION, SOLUTION INTRAVENOUS; SUBCUTANEOUS EVERY 6 HOURS
Status: DISCONTINUED | OUTPATIENT
Start: 2024-04-12 | End: 2024-04-14 | Stop reason: HOSPADM

## 2024-04-12 RX ADMIN — MAGNESIUM SULFATE HEPTAHYDRATE 1 G: 1 INJECTION, SOLUTION INTRAVENOUS at 01:04

## 2024-04-12 RX ADMIN — PANTOPRAZOLE SODIUM 40 MG: 40 INJECTION, POWDER, LYOPHILIZED, FOR SOLUTION INTRAVENOUS at 01:04

## 2024-04-12 RX ADMIN — SODIUM PHOSPHATE, MONOBASIC, MONOHYDRATE AND SODIUM PHOSPHATE, DIBASIC, ANHYDROUS 15 MMOL: 142; 276 INJECTION, SOLUTION INTRAVENOUS at 05:04

## 2024-04-12 RX ADMIN — PIPERACILLIN AND TAZOBACTAM 3.38 G: 3; .375 INJECTION, POWDER, LYOPHILIZED, FOR SOLUTION INTRAVENOUS; PARENTERAL at 07:04

## 2024-04-12 RX ADMIN — SODIUM CHLORIDE AND POTASSIUM CHLORIDE: .9; .15 SOLUTION INTRAVENOUS at 11:04

## 2024-04-12 RX ADMIN — PANTOPRAZOLE SODIUM 40 MG: 40 INJECTION, POWDER, FOR SOLUTION INTRAVENOUS at 05:04

## 2024-04-12 RX ADMIN — PANTOPRAZOLE SODIUM 40 MG: 40 INJECTION, POWDER, LYOPHILIZED, FOR SOLUTION INTRAVENOUS at 09:04

## 2024-04-12 RX ADMIN — MUPIROCIN 1 G: 20 OINTMENT TOPICAL at 09:04

## 2024-04-12 RX ADMIN — ONDANSETRON 4 MG: 2 INJECTION INTRAMUSCULAR; INTRAVENOUS at 01:04

## 2024-04-12 RX ADMIN — PIPERACILLIN SODIUM AND TAZOBACTAM SODIUM 3.38 G: 3; .375 INJECTION, POWDER, LYOPHILIZED, FOR SOLUTION INTRAVENOUS at 09:04

## 2024-04-12 RX ADMIN — SODIUM CHLORIDE AND POTASSIUM CHLORIDE: .9; .15 SOLUTION INTRAVENOUS at 01:04

## 2024-04-12 RX ADMIN — INSULIN ASPART 2 UNITS: 100 INJECTION, SOLUTION INTRAVENOUS; SUBCUTANEOUS at 11:04

## 2024-04-12 NOTE — SUBJECTIVE & OBJECTIVE
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/2012    Mixed hyperlipidemia 04/16/2018    Sensorineural hearing loss (SNHL) of both ears 07/26/2018    Type 2 diabetes mellitus 04/07/2016    Vitamin B12 deficiency (non anemic) 09/28/2015    Vitamin D deficiency 04/16/2018       Past Surgical History:   Procedure Laterality Date    EYE SURGERY Right     HEMORRHAGE       Review of patient's allergies indicates:   Allergen Reactions    Aspirin      Other reaction(s): Ecchymosis    Contrast media     Crestor [rosuvastatin] Other (See Comments)     Cramping in legs at night    Fish oil      Other reaction(s): Ecchymosis    Lipitor [atorvastatin]      Other reaction(s): Muscle pain    Lisinopril      Other reaction(s): Low blood pressure    Lortab 5-325  [hydrocodone-acetaminophen]     Simvastatin      Other reaction(s): Muscle pain       No current facility-administered medications on file prior to encounter.     Current Outpatient Medications on File Prior to Encounter   Medication Sig    alogliptin (NESINA) 12.5 mg Tab TAKE ONE TABLET BY MOUTH ONCE DAILY FOR DIABETES    ammonium lactate (LAC-HYDRIN) 12 % lotion APPLY LOTION TOPICALLY TWICE A DAY AS NEEDED APPLY TO AFFECTED AREA    blood sugar diagnostic Strp USE 1 STRIP FOR TESTING SUBCUTANEOUSLY 6 TIMES EVERY DAY TO CHECK BLOOD SUGARS    calcipotriene (DOVONOX) 0.005 % cream APPLY SMALL AMOUNT TOPICALLY TWICE A DAY FOR PSORIASIS  APPLY A SMALL AMOUNT WITH EFFUDEX CREAM TWICE EVERY DAY FOR 5-7 DAYS. START  IN 4 WEEKS. FOR PSORIASIS  APPLY A SMALL AMOUNT WITH EFFUDEX CREAM TWICE EVERY DAY FOR 5-7 DAYS. START   IN 4 WEEKS.    ciclopirox (PENLAC) 8 % Soln Apply topically nightly.    diclofenac sodium (VOLTAREN) 1 % Gel APPLY 2  "GRAMS TOPICALLY FOUR TIMES A DAY AS NEEDED FOR PAIN AND INFLAMMATION. USE ENCLOSED DOSING CARD. TO FOOT    fluorouraciL (EFUDEX) 5 % cream APPLY SMALL AMOUNT TOPICALLY TWICE A DAY APPLY TO TREATMENT AREA WITH CALCIPOTRIENE TWICE PER DAY FOR FIVE TO  SEVEN DAYS.  TAKE ONE TO TWO WEEKS OFF BEFORE TREATING ANOTHER AREA. START IN 4 WEEKS. APPLY TO TREATMENT AREA WITH CALCIPOTRIENE TWICE PER DAY FOR FIVE TO   SEVEN DAYS.  TAKE ONE TO TWO WEEKS OFF BEFORE TREATING ANOTHER AREA. START IN 4 WEEKS.    hydroCHLOROthiazide (HYDRODIURIL) 25 MG tablet TAKE ONE-HALF TABLET BY MOUTH EVERY DAY AS A DIURETIC OR "WATER PILL"    insulin aspart U-100 (NOVOLOG) 100 unit/mL injection INJECT 8 UNTIS SUBCUTANEOUSLY EVERY MORNING FOR 30 DAYS, THEN INJECT 10 UNITS NOON FOR 30 DAYS, THEN INJECT 10 UNITS EVERY EVENING FOR 30 DAYS FOR DIABETES    insulin glargine 100 units/mL (3mL) SubQ pen INJECT 18 UNTIS SUBCUTANEOUSLY AT BEDTIME FOR DIABETES     Family History    None       Tobacco Use    Smoking status: Never    Smokeless tobacco: Former     Quit date: 1981   Substance and Sexual Activity    Alcohol use: Not on file    Drug use: Not on file    Sexual activity: Not on file     Review of Systems   Constitutional:  Positive for appetite change and chills.   Respiratory:  Negative for shortness of breath.    Cardiovascular:  Negative for chest pain and leg swelling.   Gastrointestinal:  Positive for abdominal pain, nausea and vomiting. Negative for blood in stool and diarrhea.   Genitourinary:  Negative for flank pain.   Skin:  Positive for wound.     Objective:     Vital Signs (Most Recent):  Temp: 97.5 °F (36.4 °C) (04/11/24 1729)  Pulse: 100 (04/12/24 0030)  Resp: 19 (04/12/24 0030)  BP: (!) 178/96 (04/12/24 0030)  SpO2: 96 % (04/12/24 0030) Vital Signs (24h Range):  Temp:  [97.5 °F (36.4 °C)-98.1 °F (36.7 °C)] 97.5 °F (36.4 °C)  Pulse:  [] 100  Resp:  [12-20] 19  SpO2:  [96 %-100 %] 96 %  BP: (153-217)/(71-99) 178/96     Weight: 72.6 " "kg (160 lb)  Body mass index is 23.63 kg/m².     Physical Exam  Vitals reviewed.   Constitutional:       General: He is in acute distress.      Appearance: He is ill-appearing.   HENT:      Head: Normocephalic and atraumatic.      Mouth/Throat:      Mouth: Mucous membranes are moist.   Eyes:      Conjunctiva/sclera: Conjunctivae normal.   Cardiovascular:      Rate and Rhythm: Normal rate and regular rhythm.   Pulmonary:      Effort: Pulmonary effort is normal. No respiratory distress.      Breath sounds: Normal breath sounds.   Abdominal:      General: Bowel sounds are normal. There is no distension.      Palpations: Abdomen is soft.      Tenderness: There is abdominal tenderness.   Musculoskeletal:         General: Normal range of motion.      Cervical back: Normal range of motion and neck supple.      Right lower leg: No edema.      Left lower leg: No edema.   Skin:     General: Skin is warm and dry.      Comments: 1cm abrasion with erythema and honey colored crusting to base of R thumb palmar surface, liquid bandaid on superficial surface   Neurological:      Mental Status: He is alert and oriented to person, place, and time.   Psychiatric:         Mood and Affect: Mood normal.                Significant Labs: All pertinent labs within the past 24 hours have been reviewed.  Bilirubin:   Recent Labs   Lab 04/11/24  1645   BILITOT 1.0     Blood Culture: No results for input(s): "LABBLOO" in the last 48 hours.  CBC:   Recent Labs   Lab 04/11/24  1645   WBC 17.30*   HGB 13.5*   HCT 40.2        CMP:   Recent Labs   Lab 04/11/24  1645      K 3.7      CO2 34*   *   BUN 21   CREATININE 1.0   CALCIUM 9.3   PROT 6.7   ALBUMIN 3.8   BILITOT 1.0   ALKPHOS 84   AST 14   ALT 15   ANIONGAP 8     Cardiac Markers: No results for input(s): "CKMB", "MYOGLOBIN", "BNP", "TROPISTAT" in the last 48 hours.  Lactic Acid:   Recent Labs   Lab 04/11/24  2031   LACTATE 2.9*     Lipase:   Recent Labs   Lab " "04/11/24  1645   LIPASE 6     Magnesium:   Recent Labs   Lab 04/11/24  1645   MG 1.7     Troponin: No results for input(s): "TROPONINI", "TROPONINIHS" in the last 48 hours.  Urine Studies: No results for input(s): "COLORU", "APPEARANCEUA", "PHUR", "SPECGRAV", "PROTEINUA", "GLUCUA", "KETONESU", "BILIRUBINUA", "OCCULTUA", "NITRITE", "UROBILINOGEN", "LEUKOCYTESUR", "RBCUA", "WBCUA", "BACTERIA", "SQUAMEPITHEL", "HYALINECASTS" in the last 48 hours.    Invalid input(s): "WRIGHTSUR"    Significant Imaging: I have reviewed all pertinent imaging results/findings within the past 24 hours.  "

## 2024-04-12 NOTE — H&P
[facility] - Emergency Dept  Hospital Medicine  History & Physical    Patient Name: Syd Butterfield Jr.  MRN: [id number]  Patient Class: OP- Observation  Admission Date: 4/11/2024  Attending Physician: Jeff Calix MD   Primary Care Provider: Connie Mendoza MD         Patient information was obtained from patient, past medical records, and ER records.     Subjective:     Principal Problem:Intractable nausea and vomiting    Chief Complaint:   Chief Complaint   Patient presents with    Vomiting    Nausea    Weakness     Pt has been n/v on and off since April 1st. Was given zofran but no longer helping        HPI: 84-year-old male presented to ED for eval of intractable NV and abd pain. Patient reported he has had NV and epigastric pain for several weeks, with inability to tolerate PO. Reported this is his third ED visit in 2 weeks. On 4/1/24 CT abd/pelvis at [place] [facility] with large paraesophageal hernia, constipation, nonobstructing right intrarenal calculus measuring approximately 6 mm. Patient reported he was instructed to follow up with GI for the hernia but [facility] and [facility] told to go to the ED. Patient does also report many years ago he did have EGD with dil. Reported he has been taking zofran at home without relief. He also was started on Ozempic about 3 months ago and he says he is stopped taking it about 6 weeks ago or more. On my exam, patient experienced forceful retching with copious amounts of brown bile. He denies coffee ground emesis, denies hematochezia or melena. Reports intermittent soft and hard bowel movements. Patient noted to lave leukocytosis and elevated lactic acid level in ED. Patient does report chills at home. Given zosyn and IVF. Lipase unremarkable. Given previous imaging with constipation and intrarenal calculus, concern for infection, and severity of symptoms will repeat CT abd/pelvis. Admit to hospital medicine for  further eval.     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/2012    Mixed hyperlipidemia 04/16/2018    Sensorineural hearing loss (SNHL) of both ears 07/26/2018    Type 2 diabetes mellitus 04/07/2016    Vitamin B12 deficiency (non anemic) 09/28/2015    Vitamin D deficiency 04/16/2018       Past Surgical History:   Procedure Laterality Date    EYE SURGERY Right     HEMORRHAGE       Review of patient's allergies indicates:   Allergen Reactions    Aspirin      Other reaction(s): Ecchymosis    Contrast media     Crestor [rosuvastatin] Other (See Comments)     Cramping in legs at night    Fish oil      Other reaction(s): Ecchymosis    Lipitor [atorvastatin]      Other reaction(s): Muscle pain    Lisinopril      Other reaction(s): Low blood pressure    Lortab 5-325  [hydrocodone-acetaminophen]     Simvastatin      Other reaction(s): Muscle pain       No current facility-administered medications on file prior to encounter.     Current Outpatient Medications on File Prior to Encounter   Medication Sig    alogliptin (NESINA) 12.5 mg Tab TAKE ONE TABLET BY MOUTH ONCE DAILY FOR DIABETES    ammonium lactate (LAC-HYDRIN) 12 % lotion APPLY LOTION TOPICALLY TWICE A DAY AS NEEDED APPLY TO AFFECTED AREA    blood sugar diagnostic Strp USE 1 STRIP FOR TESTING SUBCUTANEOUSLY 6 TIMES EVERY DAY TO CHECK BLOOD SUGARS    calcipotriene (DOVONOX) 0.005 % cream APPLY SMALL AMOUNT TOPICALLY TWICE A DAY FOR PSORIASIS  APPLY A SMALL AMOUNT WITH EFFUDEX CREAM TWICE EVERY DAY FOR 5-7 DAYS. START  IN 4 WEEKS. FOR PSORIASIS  APPLY A SMALL AMOUNT WITH EFFUDEX CREAM TWICE EVERY DAY FOR 5-7 DAYS. START   IN 4 WEEKS.    ciclopirox (PENLAC) 8 % Soln Apply topically nightly.    diclofenac sodium (VOLTAREN)  "1 % Gel APPLY 2 GRAMS TOPICALLY FOUR TIMES A DAY AS NEEDED FOR PAIN AND INFLAMMATION. USE ENCLOSED DOSING CARD. TO FOOT    fluorouraciL (EFUDEX) 5 % cream APPLY SMALL AMOUNT TOPICALLY TWICE A DAY APPLY TO TREATMENT AREA WITH CALCIPOTRIENE TWICE PER DAY FOR FIVE TO  SEVEN DAYS.  TAKE ONE TO TWO WEEKS OFF BEFORE TREATING ANOTHER AREA. START IN 4 WEEKS. APPLY TO TREATMENT AREA WITH CALCIPOTRIENE TWICE PER DAY FOR FIVE TO   SEVEN DAYS.  TAKE ONE TO TWO WEEKS OFF BEFORE TREATING ANOTHER AREA. START IN 4 WEEKS.    hydroCHLOROthiazide (HYDRODIURIL) 25 MG tablet TAKE ONE-HALF TABLET BY MOUTH EVERY DAY AS A DIURETIC OR "WATER PILL"    insulin aspart U-100 (NOVOLOG) 100 unit/mL injection INJECT 8 UNTIS SUBCUTANEOUSLY EVERY MORNING FOR 30 DAYS, THEN INJECT 10 UNITS NOON FOR 30 DAYS, THEN INJECT 10 UNITS EVERY EVENING FOR 30 DAYS FOR DIABETES    insulin glargine 100 units/mL (3mL) SubQ pen INJECT 18 UNTIS SUBCUTANEOUSLY AT BEDTIME FOR DIABETES     Family History    None       Tobacco Use    Smoking status: Never    Smokeless tobacco: Former     Quit date: 1981   Substance and Sexual Activity    Alcohol use: Not on file    Drug use: Not on file    Sexual activity: Not on file     Review of Systems   Constitutional:  Positive for appetite change and chills.   Respiratory:  Negative for shortness of breath.    Cardiovascular:  Negative for chest pain and leg swelling.   Gastrointestinal:  Positive for abdominal pain, nausea and vomiting. Negative for blood in stool and diarrhea.   Genitourinary:  Negative for flank pain.   Skin:  Positive for wound.     Objective:     Vital Signs (Most Recent):  Temp: 97.5 °F (36.4 °C) (04/11/24 1729)  Pulse: 100 (04/12/24 0030)  Resp: 19 (04/12/24 0030)  BP: (!) 178/96 (04/12/24 0030)  SpO2: 96 % (04/12/24 0030) Vital Signs (24h Range):  Temp:  [97.5 °F (36.4 °C)-98.1 °F (36.7 °C)] 97.5 °F (36.4 °C)  Pulse:  [] 100  Resp:  [12-20] 19  SpO2:  [96 %-100 %] 96 %  BP: (153-217)/(71-99) 178/96 " "    Weight: 72.6 kg (160 lb)  Body mass index is 23.63 kg/m².     Physical Exam  Vitals reviewed.   Constitutional:       General: He is in acute distress.      Appearance: He is ill-appearing.   HENT:      Head: Normocephalic and atraumatic.      Mouth/Throat:      Mouth: Mucous membranes are moist.   Eyes:      Conjunctiva/sclera: Conjunctivae normal.   Cardiovascular:      Rate and Rhythm: Normal rate and regular rhythm.   Pulmonary:      Effort: Pulmonary effort is normal. No respiratory distress.      Breath sounds: Normal breath sounds.   Abdominal:      General: Bowel sounds are normal. There is no distension.      Palpations: Abdomen is soft.      Tenderness: There is abdominal tenderness.   Musculoskeletal:         General: Normal range of motion.      Cervical back: Normal range of motion and neck supple.      Right lower leg: No edema.      Left lower leg: No edema.   Skin:     General: Skin is warm and dry.      Comments: 1cm abrasion with erythema and honey colored crusting to base of R thumb palmar surface, liquid bandaid on superficial surface   Neurological:      Mental Status: He is alert and oriented to person, place, and time.   Psychiatric:         Mood and Affect: Mood normal.                Significant Labs: All pertinent labs within the past 24 hours have been reviewed.  Bilirubin:   Recent Labs   Lab 04/11/24  1645   BILITOT 1.0     Blood Culture: No results for input(s): "LABBLOO" in the last 48 hours.  CBC:   Recent Labs   Lab 04/11/24  1645   WBC 17.30*   HGB 13.5*   HCT 40.2        CMP:   Recent Labs   Lab 04/11/24  1645      K 3.7      CO2 34*   *   BUN 21   CREATININE 1.0   CALCIUM 9.3   PROT 6.7   ALBUMIN 3.8   BILITOT 1.0   ALKPHOS 84   AST 14   ALT 15   ANIONGAP 8     Cardiac Markers: No results for input(s): "CKMB", "MYOGLOBIN", "BNP", "TROPISTAT" in the last 48 hours.  Lactic Acid:   Recent Labs   Lab 04/11/24  2031   LACTATE 2.9*     Lipase:   Recent Labs " "  Lab 04/11/24  1645   LIPASE 6     Magnesium:   Recent Labs   Lab 04/11/24  1645   MG 1.7     Troponin: No results for input(s): "TROPONINI", "TROPONINIHS" in the last 48 hours.  Urine Studies: No results for input(s): "COLORU", "APPEARANCEUA", "PHUR", "SPECGRAV", "PROTEINUA", "GLUCUA", "KETONESU", "BILIRUBINUA", "OCCULTUA", "NITRITE", "UROBILINOGEN", "LEUKOCYTESUR", "RBCUA", "WBCUA", "BACTERIA", "SQUAMEPITHEL", "HYALINECASTS" in the last 48 hours.    Invalid input(s): "WRIGHTSUR"    Significant Imaging: I have reviewed all pertinent imaging results/findings within the past 24 hours.  Assessment/Plan:     * Intractable nausea and vomiting  Repeat CT abd/pelvis given previous CT with kidney stone and moderate amount of stool   Protonix IV BID  IVF   Zofran and phenergan PRN NV  Monitor and replace lytes   Consult GI  Holding home PO meds until patient able to tolerate PO      Leukocytosis  Initial lactic 2.9  CXR  UA  Repeat lactic  Procalcitonin  Blood cx's  Zosyn   Repeat CBC in am      Open wound of right hand  Blood cx's  Procalcitonin  R hand XR given open wound, leukocytosis, and DM    Diabetes mellitus  Patient's FSGs are controlled on current medication regimen.  Last A1c reviewed- No results found for: "LABA1C", "HGBA1C"  Most recent fingerstick glucose reviewed- No results for input(s): "POCTGLUCOSE" in the last 24 hours.  Current correctional scale  Low  Maintain anti-hyperglycemic dose as follows-   Antihyperglycemics (From admission, onward)      Start     Stop Route Frequency Ordered    04/12/24 0600  insulin aspart U-100 pen 0-5 Units         -- SubQ Every 6 hours 04/12/24 0055          Hold Oral hypoglycemics while patient is in the hospital.      VTE Risk Mitigation (From admission, onward)           Ordered     Reason for No Pharmacological VTE Prophylaxis  Once        Question:  Reasons:  Answer:  Risk of Bleeding    04/12/24 0055     IP VTE HIGH RISK PATIENT  Once         04/12/24 0055     Place " sequential compression device  Until discontinued         04/12/24 0055                         On 04/12/2024, patient should be placed in hospital observation services under my care in collaboration with Dr. Calix.           Love Huang NP  Department of Hospital Medicine  [facility] - Emergency Dept      Reviewed results of in house repeat CT abd/pelvis, discussed findings and concern for gastric volvulus with general surgery on call, orders placed for NGT to LIS, strict NPO, stauffer placement, and consult for general surgery. Upgrading level of care to SCU.

## 2024-04-12 NOTE — ASSESSMENT & PLAN NOTE
Repeat CT abd/pelvis given previous CT with kidney stone and moderate amount of stool   Protonix IV BID  IVF   Zofran and phenergan PRN NV  Monitor and replace lytes   Consult GI  Holding home PO meds until patient able to tolerate PO

## 2024-04-12 NOTE — HPI
84-year-old male presented to ED for eval of intractable NV and abd pain. Patient reported he has had NV and epigastric pain for several weeks, with inability to tolerate PO. Reported this is his third ED visit in 2 weeks. On 4/1/24 CT abd/pelvis at [place] [facility] with large paraesophageal hernia, constipation, nonobstructing right intrarenal calculus measuring approximately 6 mm. Patient reported he was instructed to follow up with GI for the hernia but [facility] and was told to go to the ED. Patient does also report many years ago he did have EGD with dil. Reported he has been taking zofran at home without relief. He also was started on Ozempic about 3 months ago and he says he is stopped taking it about 6 weeks ago or more. On my exam, patient experienced forceful retching with copious amounts of brown bile. He denies coffee ground emesis, denies hematochezia or melena. Reports intermittent soft and hard bowel movements. Patient noted to lave leukocytosis and elevated lactic acid level in ED. Patient does report chills at home. Given zosyn and IVF. Lipase unremarkable. Given previous imaging with constipation and intrarenal calculus, concern for infection, and severity of symptoms will repeat CT abd/pelvis. Admit to hospital medicine for further eval.

## 2024-04-12 NOTE — PROGRESS NOTES
[facility] - Emergency Dept  Hospital Medicine  Progress Note    Patient Name: Syd Butterfield Jr.  MRN: [id number]  Patient Class: IP- Inpatient   Admission Date: 4/11/2024  Length of Stay: 0 days  Attending Physician: Fatou Rowley MD  Primary Care Provider: Connie Mendoza MD        Subjective:     Principal Problem:Intractable nausea and vomiting        HPI:  84-year-old male presented to ED for eval of intractable NV and abd pain. Patient reported he has had NV and epigastric pain for several weeks, with inability to tolerate PO. Reported this is his third ED visit in 2 weeks. On 4/1/24 CT abd/pelvis at [facility] impression with large paraesophageal hernia, constipation, nonobstructing right intrarenal calculus measuring approximately 6 mm. Patient reported he was instructed to follow up with GI for the hernia but he has been [facility] to [facility] and was told to go to the ED. Patient does also report many years ago he did have EGD with dil. Reported he has been taking zofran at home without relief. He also was started on Ozempic about 3 months ago and he says he is stopped taking it about 6 weeks ago or more. On my exam, patient experienced forceful retching with copious amounts of brown bile. He denies coffee ground emesis, denies hematochezia or melena. Reports intermittent soft and hard bowel movements. Patient noted to lave leukocytosis and elevated lactic acid level in ED. Patient does report chills at home. Given zosyn and IVF. Lipase unremarkable. Given previous imaging with constipation and intrarenal calculus, concern for infection, and severity of symptoms will repeat CT abd/pelvis. Admit to hospital medicine for further eval.     Overview/Hospital Course:  No notes on file    Interval History:   84-year-old male with history of diabetes hypertension, hiatal hernia presenting here for recurrent nausea vomiting abdominal pain.  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  Patient has NG tube placement has significant output.     Seen in the multidisciplinary rounds, patient is still on NG tube with intermittent suction, has large amount greenish fluid come out, no fever or chills, GI and general surgery has been consulted and pending evaluation.      Review of Systems   Constitutional:  Negative for activity change and fever.   HENT:  Negative for ear discharge, facial swelling and sore throat.    Eyes:  Negative for photophobia, pain and visual disturbance.   Respiratory:  Negative for apnea, cough and shortness of breath.    Cardiovascular:  Negative for chest pain and leg swelling.   Gastrointestinal:  Positive for abdominal distention, nausea and vomiting. Negative for abdominal pain and blood in stool.   Endocrine: Negative for cold intolerance and heat intolerance.   Musculoskeletal:  Negative for back pain and gait problem.   Skin:  Negative for pallor and rash.   Neurological:  Negative for speech difficulty and headaches.   Psychiatric/Behavioral:  Negative for confusion, hallucinations and suicidal ideas.    All other systems reviewed and are negative.    Objective:     Vital Signs (Most Recent):  Temp: 97.5 °F (36.4 °C) (04/11/24 1729)  Pulse: 77 (04/12/24 1200)  Resp: 15 (04/12/24 1200)  BP: (!) 156/73 (04/12/24 1200)  SpO2: 96 % (04/12/24 1200) Vital Signs (24h Range):  Temp:  [97.5 °F (36.4 °C)-98.1 °F (36.7 °C)] 97.5 °F (36.4 °C)  Pulse:  [] 77  Resp:  [11-20] 15  SpO2:  [96 %-100 %] 96 %  BP: (148-217)/(70-99) 156/73     Weight: 72.6 kg (160 lb)  Body mass index is 23.63 kg/m².    Intake/Output Summary (Last 24 hours) at 4/12/2024 1333  Last data filed at 4/12/2024 0946  Gross per 24 hour   Intake 400 ml   Output 100 ml   Net 300 ml         Physical Exam  Vitals and nursing note reviewed.   Constitutional:       General: He is not in acute distress.     Appearance: He is not diaphoretic.   HENT:      Head: Normocephalic.   Eyes:      General: No  scleral icterus.        Right eye: No discharge.         Left eye: No discharge.      Conjunctiva/sclera: Conjunctivae normal.   Neck:      Vascular: No JVD.   Cardiovascular:      Rate and Rhythm: Normal rate and regular rhythm.      Heart sounds: Normal heart sounds. No murmur heard.     No friction rub.   Pulmonary:      Effort: Pulmonary effort is normal. No respiratory distress.      Breath sounds: Normal breath sounds. No wheezing.   Abdominal:      General: Bowel sounds are normal. There is distension.      Palpations: Abdomen is soft.      Tenderness: There is abdominal tenderness.   Musculoskeletal:         General: Normal range of motion.   Lymphadenopathy:      Cervical: No cervical adenopathy.   Skin:     Findings: No erythema or rash.   Neurological:      Mental Status: He is alert and oriented to person, place, and time.      Deep Tendon Reflexes: Reflexes are normal and symmetric.   Psychiatric:         Behavior: Behavior normal.             Significant Labs: All pertinent labs within the past 24 hours have been reviewed.  CBC:   Recent Labs   Lab 04/11/24  1645 04/12/24  0519   WBC 17.30* 15.81*   HGB 13.5* 12.1*   HCT 40.2 36.7*    293     CMP:   Recent Labs   Lab 04/11/24  1645 04/12/24  0519    142   K 3.7 4.5    104   CO2 34* 30*   * 270*   BUN 21 20   CREATININE 1.0 1.0   CALCIUM 9.3 8.0*   PROT 6.7 5.8*   ALBUMIN 3.8 3.3*   BILITOT 1.0 0.7   ALKPHOS 84 75   AST 14 16   ALT 15 12   ANIONGAP 8 8       Significant Imaging: I have reviewed all pertinent imaging results/findings within the past 24 hours.  I have reviewed and interpreted all pertinent imaging results/findings within the past 24 hours.    X-Ray Chest AP Portable    Result Date: 4/12/2024  EXAMINATION: XR CHEST AP PORTABLE April 12, 2024, 326 hours CLINICAL HISTORY: Encounter for fitting and adjustment of other gastrointestinal appliance and device TECHNIQUE: Single frontal view of the chest was performed.  COMPARISON: Chest radiograph April 12, 2024, 112 hours FINDINGS: Single portable chest view is submitted.  There is interval placement of an enteric tube, the distal tip extends subdiaphragmatic overlying the left upper quadrant of the abdomen.  Otherwise the appearance of the cardiomediastinal silhouette, left lung base and diaphragmatic and gastric findings, and intrathoracic appearance is stable.     Enteric tube noted, the distal tip extends subdiaphragmatic. Electronically signed by: Nazario Davis Date:    04/12/2024 Time:    04:09    X-Ray Hand 3 View Right    Result Date: 4/12/2024  EXAMINATION: XR HAND COMPLETE 3 VIEW RIGHT CLINICAL HISTORY: wound, leukocytosis; TECHNIQUE: PA, lateral, and oblique views of the right hand were performed. COMPARISON: Radiograph right hand November 12, 2020 FINDINGS: The visualized osseous structures demonstrate chronic change.  This includes prominent chronic change at the distal interphalangeal joint of the 1st digit, thumb with appearance of possible fusion or near fusion, appearing similar to the prior study.  The overall appearance is that of chronic changes with progression compared to the prior examination, however there is no evidence for superimposed acute fracture or dislocation. Vascular calcifications are noted, there is no radiographically detectable radiopaque foreign body.  Close clinical and historical correlation is otherwise needed to determine need for additional follow-up.     Radiographic findings as above. Electronically signed by: Nazario Davis Date:    04/12/2024 Time:    02:47    X-Ray Chest 1 View    Result Date: 4/12/2024  EXAMINATION: XR CHEST 1 VIEW CLINICAL HISTORY: leukocytosis; TECHNIQUE: Single frontal view of the chest was performed. COMPARISON: None FINDINGS: Single chest view is submitted.  There is diminished depth of inspiration and there is mild atelectatic change.  Abnormality at the left lung base correlates with findings on CT  examination of the abdomen and pelvis associated with diaphragmatic hernia and gastric volvulus, referral to the recent CT examination is recommended. Otherwise heart size appears appropriate aortic atherosclerotic change noted.  Accentuation of pulmonary bronchovascular markings consistent with mild diminished depth of inspiration noted. There is no evidence for confluent infiltrate or consolidation, significant pleural effusion or pneumothorax.  Mild atelectatic change noted. The osseous structures demonstrate chronic change.     Radiographic abnormality correlating with findings on recent CT examination referable to diaphragmatic hernia and gastric volvulus. The lungs demonstrate diminished depth of inspiration and mild atelectatic change. Electronically signed by: Nazario Davis Date:    04/12/2024 Time:    02:42    CT Abdomen Pelvis  Without Contrast    Result Date: 4/12/2024  EXAMINATION: CT ABDOMEN PELVIS WITHOUT CONTRAST CLINICAL HISTORY: Epigastric pain;Nausea/vomiting; TECHNIQUE: Low dose axial images, sagittal and coronal reformations were obtained from the lung bases to the pubic symphysis.  Intravenous contrast and oral contrast was not utilized, this diminishes the sensitivity of the examination. COMPARISON: None FINDINGS: The visualized lung bases demonstrate atelectatic change.  There is a small right pleural effusion noted.  Coronary arterial atherosclerotic calcification noted. There is prominent circumferential thickening of the visualized distal thoracic esophagus, this is nonspecific however can be seen with esophagitis.  Endoscopy or upper GI may be helpful for further evaluation. There is complex appearance of herniation at the level of the diaphragmatic hiatus.  The proximal aspect of the stomach appears to herniate in a fashion typical of a hiatal hernia, however there is appearance organo-axial rotation of the stomach with supradiaphragmatic paraesophageal herniation of a portion of the  rotated stomach.  The aforementioned segments of the stomach appear distended with fluid and air.  Distal to the distended segments of stomach there is narrowing of the stomach as it extends inferiorly into the abdominal cavity.  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 There is no evidence for pneumatosis, there is no evidence for mesenteric venous air and there is no evidence for portal venous air.  There is no evidence for free intraperitoneal air. As seen on axial image 66, coronal image 40, and sagittal image 42, there is a finding that is suspicious for a small pancreatic mass lesion involving the junction of the body and tail of the pancreas measuring up to approximately 2.3 cm in size.  Dedicated pancreatic protocol imaging follow-up is recommended when the patient can tolerate the examination. There is no peripancreatic inflammatory change, there is no abnormal pancreatic or biliary ductal dilatation. When accounting for limitations of the exam, there is no evidence for acute process of the liver, gallbladder, spleen, or adrenal glands.  Small adrenal calcification on the left is noted. There is a calculus at the upper pole of the right kidney measuring approximately 7.1 mm in size, nonobstructing.  There is no evidence for ureteral calculus or obstructive uropathy bilaterally.  There is perinephric stranding bilaterally, nonspecific and symmetric may relate to a baseline appearance however correlation for renal disease to include UTI is needed. The abdominal aorta appears normal in caliber, atherosclerotic change noted, otherwise not optimally evaluated on this examination. There is moderate distention of the urinary bladder, there is no abnormal urinary bladder wall thickening, the appearance is nonspecific however correlation for urinary retention or urinary outlet obstructive symptomatology is needed.  The prostate  appears enlarged measuring approximately 6 x 4.2 cm in size. There is a tiny fat density umbilical hernia without bowel involvement.  There is no evidence for small bowel obstructive process.  The appendix is not identified.  Mild air and stool noted throughout the colon without inflammatory or obstructive change. The visualized osseous structures demonstrate chronic change.     Abnormal appearance of the stomach, the configuration of which is thought most consistent with organo-axial rotation with supradiaphragmatic extension and associated obstruction therefore most concerning for gastric volvulus, as discussed above. Circumferential thickening of the distal esophagus, can be evaluated with upper GI or endoscopy. Suspected small pancreatic mass lesion, if previously unknown dedicated pancreatic imaging follow-up when the patient can tolerate is recommended. Bilateral perinephric stranding without obstructive uropathy is nonspecific and may relate to a baseline appearance however correlation for renal disease to include UTI/pyelonephritis is needed. Urinary bladder distention, nonspecific however correlation for bladder outlet obstructive symptomatology or urinary retention is needed. Small right pleural effusion. Additional findings as above. This report was flagged in Epic as abnormal. The findings were discussed with the ER physician at the time of dictation. Electronically signed by: Nazario Davis Date:    04/12/2024 Time:    01:17    CT Abdomen Pelvis  Without Contrast    Result Date: 4/1/2024  CT ABDOMEN PELVIS WITHOUT History: Dysphagia, clinical suspicion for food impaction at the gastroesophageal junction. Comparison: None Findings: Unremarkable lung bases and osseous structures. There is a large paraesophageal hernia with mild fluid distention of the gastric lumen. There is moderate colonic fecal retention. There is a nonobstructing right intrarenal calculus measuring approximately 6 mm. Remaining upper  "abdominal viscera demonstrate a normal appearance. Lower abdomen and pelvis demonstrates normal small bowel loops, colon and genitourinary organs.    1. Large paraesophageal hernia. 2. Mild constipation. 3. Nonobstructing right intrarenal calculus measuring approximately 6 mm. This CT examination was performed using one or more of the following dose reduction techniques: Automated exposure control, adjustment of the mA and or kv according to patient size, use of iterative reconstruction techniques. This report was signed by Roger Good MD on 4/1/2024 3:27 AM on the following workstation: [id number].   - pulls last radiology orders      Assessment/Plan:      * Intractable nausea and vomiting  Imaging study reviewed, patient had large hiatal hernia/paraesophagus hernia which likely cause gastric volvulus.  Need to rule out gastric outlet obstruction.  Continue NG tube with suction  GI and general surgery has been consulted  Continue PPI b.i.d.  NPO    Diabetes mellitus  Patient's FSGs are controlled on current medication regimen.  Last A1c reviewed- No results found for: "LABA1C", "HGBA1C"  Most recent fingerstick glucose reviewed- No results for input(s): "POCTGLUCOSE" in the last 24 hours.  Current correctional scale  Low  Maintain anti-hyperglycemic dose as follows-   Antihyperglycemics (From admission, onward)      Start     Stop Route Frequency Ordered    04/12/24 0600  insulin aspart U-100 pen 0-5 Units         -- SubQ Every 6 hours 04/12/24 0055          Hold Oral hypoglycemics while patient is in the hospital.    Open wound of right hand  Blood cx's  Procalcitonin  R hand XR given open wound, leukocytosis, and DM    Leukocytosis  Very unlikely infectious  Empiric IV Zosyn now  IV hydration        VTE Risk Mitigation (From admission, onward)           Ordered     Reason for No Pharmacological VTE Prophylaxis  Once        Question:  Reasons:  Answer:  Risk of Bleeding    04/12/24 0055     IP VTE HIGH " RISK PATIENT  Once         04/12/24 0055     Place sequential compression device  Until discontinued         04/12/24 0055                    Discharge Planning   SHAKIR: 4/16/2024     Code Status: Full Code   Is the patient medically ready for discharge?:     Reason for patient still in hospital (select all that apply): Patient trending condition and Treatment  Discharge Plan A: Home                  Fatou Rowley MD  Department of Hospital Medicine   [facility] - Emergency Dept

## 2024-04-12 NOTE — ASSESSMENT & PLAN NOTE
"Patient's FSGs are controlled on current medication regimen.  Last A1c reviewed- No results found for: "LABA1C", "HGBA1C"  Most recent fingerstick glucose reviewed- No results for input(s): "POCTGLUCOSE" in the last 24 hours.  Current correctional scale  Low  Maintain anti-hyperglycemic dose as follows-   Antihyperglycemics (From admission, onward)      Start     Stop Route Frequency Ordered    04/12/24 0600  insulin aspart U-100 pen 0-5 Units         -- SubQ Every 6 hours 04/12/24 0055          Hold Oral hypoglycemics while patient is in the hospital.  "

## 2024-04-12 NOTE — ED NOTES
Bed: 28  Expected date:   Expected time:   Means of arrival:   Comments:  ROMAN Butterfield when clean

## 2024-04-12 NOTE — PHARMACY MED REC
"Admission Medication History     The home medication history was taken by Reggie Olmos.    You may go to "Admission" then "Reconcile Home Medications" tabs to review and/or act upon these items.     The home medication list has been updated by the Pharmacy department.   Please read ALL comments highlighted in yellow.   Please address this information as you see fit.    Feel free to contact us if you have any questions or require assistance.      The medications listed below were removed from the home medication list. Please reorder if appropriate:  Patient reports no longer taking the following medication(s):  Alogliptin 12.5 mg  Ammonium Lactate  Ciclopirox Sol  Diclofenac Gel    Medications listed below were obtained from: Patient/family and Analytic software- [employer]  No current facility-administered medications on file prior to encounter.     Current Outpatient Medications on File Prior to Encounter   Medication Sig Dispense Refill    calcipotriene (DOVONOX) 0.005 % cream Apply 1 application  topically 2 (two) times daily.      empagliflozin (JARDIANCE) 25 mg tablet Take 1 tablet by mouth every morning.      fluorouraciL (EFUDEX) 5 % cream Apply 1 g topically 2 (two) times daily.      hydroCHLOROthiazide (HYDRODIURIL) 25 MG tablet Take 25 mg by mouth once daily.      insulin aspart U-100 (NOVOLOG) 100 unit/mL injection INJECT 8 UNTIS SUBCUTANEOUSLY EVERY MORNING FOR 30 DAYS, THEN INJECT 10 UNITS NOON FOR 30 DAYS, THEN INJECT 10 UNITS EVERY EVENING FOR 30 DAYS FOR DIABETES      insulin glargine 100 units/mL (3mL) SubQ pen INJECT 18 UNTIS SUBCUTANEOUSLY AT BEDTIME FOR DIABETES      lipase-protease-amylase 12,000-38,000-60,000 units (CREON) CpDR Take 1 capsule by mouth every morning.      blood sugar diagnostic Strp USE 1 STRIP FOR TESTING SUBCUTANEOUSLY 6 TIMES EVERY DAY TO CHECK BLOOD SUGARS      [DISCONTINUED] alogliptin (NESINA) 12.5 mg Tab TAKE ONE TABLET BY MOUTH ONCE DAILY FOR DIABETES      [DISCONTINUED] " ammonium lactate (LAC-HYDRIN) 12 % lotion APPLY LOTION TOPICALLY TWICE A DAY AS NEEDED APPLY TO AFFECTED AREA      [DISCONTINUED] ciclopirox (PENLAC) 8 % Soln Apply topically nightly. 6.6 mL 11    [DISCONTINUED] diclofenac sodium (VOLTAREN) 1 % Gel APPLY 2 GRAMS TOPICALLY FOUR TIMES A DAY AS NEEDED FOR PAIN AND INFLAMMATION. USE ENCLOSED DOSING CARD. TO FOOT               Reggie Olmos  EXT 1924                .

## 2024-04-12 NOTE — ED NOTES
Bed: [place] [id number]  Expected date:   Expected time:   Means of arrival:   Comments:  ROMAN Butterfield when clean

## 2024-04-12 NOTE — ED NOTES
Pt. Seated on stretcher, axox4, neurologically intact, warm, dry pink skin, breathing adequate with minimal effort, regurgitates intermittently

## 2024-04-12 NOTE — ASSESSMENT & PLAN NOTE
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  Need to rule out gastric outlet obstruction.  Continue NG tube with suction  GI and general surgery has been consulted  Continue PPI b.i.d.  NPO

## 2024-04-12 NOTE — CONSULTS
Count includes the [facility] - Emergency Dept  General Surgery  Consult Note    Inpatient consult to General Surgery  Consult performed by: Nicolás Pryor MD  Consult ordered by: Love Huang NP        Subjective:     Chief Complaint/Reason for Admission:  Intractable nausea and vomiting with meals    History of Present Illness:  This is an 84-year-old male, he denies prior abdominal surgery, who has been dealing with persistent nausea and vomiting with meals.  He was told he has a hiatal hernia and to follow up with GI.  He presented to our facility with intractable nausea and vomiting.  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  An NG-tube was placed.  Patient is currently stable.  He denies abdominal pain.  Symptoms have improved since NG tube placement.    No current facility-administered medications on file prior to encounter.     Current Outpatient Medications on File Prior to Encounter   Medication Sig    calcipotriene (DOVONOX) 0.005 % cream Apply 1 application  topically 2 (two) times daily.    empagliflozin (JARDIANCE) 25 mg tablet Take 1 tablet by mouth every morning.    fluorouraciL (EFUDEX) 5 % cream Apply 1 g topically 2 (two) times daily.    hydroCHLOROthiazide (HYDRODIURIL) 25 MG tablet Take 25 mg by mouth once daily.    insulin aspart U-100 (NOVOLOG) 100 unit/mL injection INJECT 8 UNTIS SUBCUTANEOUSLY EVERY MORNING FOR 30 DAYS, THEN INJECT 10 UNITS NOON FOR 30 DAYS, THEN INJECT 10 UNITS EVERY EVENING FOR 30 DAYS FOR DIABETES    insulin glargine 100 units/mL (3mL) SubQ pen INJECT 18 UNTIS SUBCUTANEOUSLY AT BEDTIME FOR DIABETES    lipase-protease-amylase 12,000-38,000-60,000 units (CREON) CpDR Take 1 capsule by mouth every morning.    blood sugar diagnostic Strp USE 1 STRIP FOR TESTING SUBCUTANEOUSLY 6 TIMES EVERY DAY TO CHECK BLOOD SUGARS    [DISCONTINUED] alogliptin (NESINA) 12.5 mg Tab TAKE ONE TABLET BY MOUTH ONCE DAILY FOR DIABETES     [DISCONTINUED] ammonium lactate (LAC-HYDRIN) 12 % lotion APPLY LOTION TOPICALLY TWICE A DAY AS NEEDED APPLY TO AFFECTED AREA    [DISCONTINUED] ciclopirox (PENLAC) 8 % Soln Apply topically nightly.    [DISCONTINUED] diclofenac sodium (VOLTAREN) 1 % Gel APPLY 2 GRAMS TOPICALLY FOUR TIMES A DAY AS NEEDED FOR PAIN AND INFLAMMATION. USE ENCLOSED DOSING CARD. TO FOOT       Review of patient's allergies indicates:   Allergen Reactions    Aspirin      Other reaction(s): Ecchymosis    Contrast media     Crestor [rosuvastatin] Other (See Comments)     Cramping in legs at night    Fish oil      Other reaction(s): Ecchymosis    Lipitor [atorvastatin]      Other reaction(s): Muscle pain    Lisinopril      Other reaction(s): Low blood pressure    Lortab 5-325  [hydrocodone-acetaminophen]     Simvastatin      Other reaction(s): Muscle pain       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/2012    Mixed hyperlipidemia 04/16/2018    Sensorineural hearing loss (SNHL) of both ears 07/26/2018    Type 2 diabetes mellitus 04/07/2016    Vitamin B12 deficiency (non anemic) 09/28/2015    Vitamin D deficiency 04/16/2018     Past Surgical History:   Procedure Laterality Date    EYE SURGERY Right     HEMORRHAGE     Family History    None       Tobacco Use    Smoking status: Never    Smokeless tobacco: Former     Quit date: 1981   Substance and Sexual Activity    Alcohol use: Not on file    Drug use: Not on file    Sexual activity: Not on file     Review of Systems   Constitutional: Negative.  Negative for fatigue and fever.   HENT: Negative.     Eyes: Negative.    Respiratory: Negative.  Negative for shortness of breath.    Cardiovascular: Negative.  Negative for chest pain.   Gastrointestinal:   Positive for nausea and vomiting.   Endocrine: Negative.    Genitourinary: Negative.    Musculoskeletal: Negative.    Skin: Negative.    Allergic/Immunologic: Negative.    Neurological: Negative.    Hematological: Negative.    Psychiatric/Behavioral: Negative.       Objective:     Vital Signs (Most Recent):  Temp: 97.5 °F (36.4 °C) (04/11/24 1729)  Pulse: 71 (04/12/24 1300)  Resp: 12 (04/12/24 1300)  BP: (!) 161/75 (04/12/24 1300)  SpO2: 97 % (04/12/24 1300) Vital Signs (24h Range):  Temp:  [97.5 °F (36.4 °C)-98.1 °F (36.7 °C)] 97.5 °F (36.4 °C)  Pulse:  [] 71  Resp:  [11-20] 12  SpO2:  [96 %-100 %] 97 %  BP: (148-217)/(70-99) 161/75     Weight: 72.6 kg (160 lb)  Body mass index is 23.63 kg/m².      Intake/Output Summary (Last 24 hours) at 4/12/2024 1501  Last data filed at 4/12/2024 0946  Gross per 24 hour   Intake 400 ml   Output 100 ml   Net 300 ml       Physical Exam  Constitutional:       General: He is not in acute distress.     Appearance: Normal appearance. He is not ill-appearing, toxic-appearing or diaphoretic.   HENT:      Head: Normocephalic.      Nose: Nose normal.   Eyes:      Conjunctiva/sclera: Conjunctivae normal.   Cardiovascular:      Rate and Rhythm: Normal rate and regular rhythm.   Pulmonary:      Effort: Pulmonary effort is normal.   Abdominal:      Palpations: Abdomen is soft.   Musculoskeletal:         General: Normal range of motion.      Cervical back: Normal range of motion.   Skin:     General: Skin is warm.   Neurological:      General: No focal deficit present.      Mental Status: He is alert.   Psychiatric:         Mood and Affect: Mood normal.         Significant Labs:  CBC:   Recent Labs   Lab 04/12/24  0519   WBC 15.81*   RBC 4.01*   HGB 12.1*   HCT 36.7*      MCV 92   MCH 30.2   MCHC 33.0     CMP:   Recent Labs   Lab 04/12/24  0519   *   CALCIUM 8.0*   ALBUMIN 3.3*   PROT 5.8*      K 4.5   CO2 30*      BUN 20   CREATININE 1.0   ALKPHOS 75   ALT 12    AST 16   BILITOT 0.7     Coagulation:   Recent Labs   Lab 04/12/24  0519   INR 1.0     Lactic Acid:   Recent Labs   Lab 04/12/24  0519   LACTATE 1.0       Significant Diagnostics:  CT scan was reviewed.  There appears to be gastric dilation with what appears to be a gastric volvulus within a large hiatal hernia.  There was also what appears to be a pancreatic mass which is incompletely characterized.    Assessment/Plan:     Active Diagnoses:    Diagnosis Date Noted POA    PRINCIPAL PROBLEM:  Intractable nausea and vomiting [R11.2] 04/12/2024 Yes    Leukocytosis [D72.829] 04/12/2024 Yes    Open wound of right hand [S61.401A] 04/12/2024 Yes    Diabetes mellitus [E11.9] 04/12/2024 Yes      Problems Resolved During this Admission:     84-year-old male admitted with likely gastric volvulus and obstruction.  Patient also has a  pancreatic mass.  Given persistent obstructive symptoms, recommended operative exploration with likely hiatal hernia repair, possible fundoplication, possible gastropexy.  This will also likely facilitate future endoscopic options for pancreatic mass biopsy.      Recommend transfer to [facility] for definitive robotic surgery.    Patient is currently hemodynamically stable.  Denies any abdominal pain or discomfort.  NG tube appears to be below the diaphragm.      Thank you for your consult. I will follow-up with patient. Please contact us if you have any additional questions.    Nicolás Pryor MD  General Surgery  [facility] - Emergency Dept

## 2024-04-12 NOTE — NURSING
Notified Dr Nelson of pt arrival to facility & of elevated B/P 183/83 pulse 78, MD stated to continue to monitor for now

## 2024-04-12 NOTE — SUBJECTIVE & OBJECTIVE
Interval History:   84-year-old male with history of diabetes hypertension, hiatal hernia presenting here for recurrent nausea vomiting abdominal pain.  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  Patient has NG tube placement has significant output.     Seen in the multidisciplinary rounds, patient is still on NG tube with intermittent suction, has large amount greenish fluid come out, no fever or chills, GI and general surgery has been consulted and pending evaluation.      Review of Systems   Constitutional:  Negative for activity change and fever.   HENT:  Negative for ear discharge, facial swelling and sore throat.    Eyes:  Negative for photophobia, pain and visual disturbance.   Respiratory:  Negative for apnea, cough and shortness of breath.    Cardiovascular:  Negative for chest pain and leg swelling.   Gastrointestinal:  Positive for abdominal distention, nausea and vomiting. Negative for abdominal pain and blood in stool.   Endocrine: Negative for cold intolerance and heat intolerance.   Musculoskeletal:  Negative for back pain and gait problem.   Skin:  Negative for pallor and rash.   Neurological:  Negative for speech difficulty and headaches.   Psychiatric/Behavioral:  Negative for confusion, hallucinations and suicidal ideas.    All other systems reviewed and are negative.    Objective:     Vital Signs (Most Recent):  Temp: 97.5 °F (36.4 °C) (04/11/24 1729)  Pulse: 77 (04/12/24 1200)  Resp: 15 (04/12/24 1200)  BP: (!) 156/73 (04/12/24 1200)  SpO2: 96 % (04/12/24 1200) Vital Signs (24h Range):  Temp:  [97.5 °F (36.4 °C)-98.1 °F (36.7 °C)] 97.5 °F (36.4 °C)  Pulse:  [] 77  Resp:  [11-20] 15  SpO2:  [96 %-100 %] 96 %  BP: (148-217)/(70-99) 156/73     Weight: 72.6 kg (160 lb)  Body mass index is 23.63 kg/m².    Intake/Output Summary (Last 24 hours) at 4/12/2024 1333  Last data filed at 4/12/2024 0937  Gross per 24 hour   Intake 400 ml   Output 100 ml   Net 300 ml         Physical  Exam  Vitals and nursing note reviewed.   Constitutional:       General: He is not in acute distress.     Appearance: He is not diaphoretic.   HENT:      Head: Normocephalic.   Eyes:      General: No scleral icterus.        Right eye: No discharge.         Left eye: No discharge.      Conjunctiva/sclera: Conjunctivae normal.   Neck:      Vascular: No JVD.   Cardiovascular:      Rate and Rhythm: Normal rate and regular rhythm.      Heart sounds: Normal heart sounds. No murmur heard.     No friction rub.   Pulmonary:      Effort: Pulmonary effort is normal. No respiratory distress.      Breath sounds: Normal breath sounds. No wheezing.   Abdominal:      General: Bowel sounds are normal. There is distension.      Palpations: Abdomen is soft.      Tenderness: There is abdominal tenderness.   Musculoskeletal:         General: Normal range of motion.   Lymphadenopathy:      Cervical: No cervical adenopathy.   Skin:     Findings: No erythema or rash.   Neurological:      Mental Status: He is alert and oriented to person, place, and time.      Deep Tendon Reflexes: Reflexes are normal and symmetric.   Psychiatric:         Behavior: Behavior normal.             Significant Labs: All pertinent labs within the past 24 hours have been reviewed.  CBC:   Recent Labs   Lab 04/11/24  1645 04/12/24  0519   WBC 17.30* 15.81*   HGB 13.5* 12.1*   HCT 40.2 36.7*    293     CMP:   Recent Labs   Lab 04/11/24  1645 04/12/24  0519    142   K 3.7 4.5    104   CO2 34* 30*   * 270*   BUN 21 20   CREATININE 1.0 1.0   CALCIUM 9.3 8.0*   PROT 6.7 5.8*   ALBUMIN 3.8 3.3*   BILITOT 1.0 0.7   ALKPHOS 84 75   AST 14 16   ALT 15 12   ANIONGAP 8 8       Significant Imaging: I have reviewed all pertinent imaging results/findings within the past 24 hours.  I have reviewed and interpreted all pertinent imaging results/findings within the past 24 hours.    X-Ray Chest AP Portable    Result Date: 4/12/2024  EXAMINATION: XR CHEST AP  PORTABLE April 12, 2024, 326 hours CLINICAL HISTORY: Encounter for fitting and adjustment of other gastrointestinal appliance and device TECHNIQUE: Single frontal view of the chest was performed. COMPARISON: Chest radiograph April 12, 2024, 112 hours FINDINGS: Single portable chest view is submitted.  There is interval placement of an enteric tube, the distal tip extends subdiaphragmatic overlying the left upper quadrant of the abdomen.  Otherwise the appearance of the cardiomediastinal silhouette, left lung base and diaphragmatic and gastric findings, and intrathoracic appearance is stable.     Enteric tube noted, the distal tip extends subdiaphragmatic. Electronically signed by: Nazario Davis Date:    04/12/2024 Time:    04:09    X-Ray Hand 3 View Right    Result Date: 4/12/2024  EXAMINATION: XR HAND COMPLETE 3 VIEW RIGHT CLINICAL HISTORY: wound, leukocytosis; TECHNIQUE: PA, lateral, and oblique views of the right hand were performed. COMPARISON: Radiograph right hand November 12, 2020 FINDINGS: The visualized osseous structures demonstrate chronic change.  This includes prominent chronic change at the distal interphalangeal joint of the 1st digit, thumb with appearance of possible fusion or near fusion, appearing similar to the prior study.  The overall appearance is that of chronic changes with progression compared to the prior examination, however there is no evidence for superimposed acute fracture or dislocation. Vascular calcifications are noted, there is no radiographically detectable radiopaque foreign body.  Close clinical and historical correlation is otherwise needed to determine need for additional follow-up.     Radiographic findings as above. Electronically signed by: Nazario Davis Date:    04/12/2024 Time:    02:47    X-Ray Chest 1 View    Result Date: 4/12/2024  EXAMINATION: XR CHEST 1 VIEW CLINICAL HISTORY: leukocytosis; TECHNIQUE: Single frontal view of the chest was performed. COMPARISON: None  FINDINGS: Single chest view is submitted.  There is diminished depth of inspiration and there is mild atelectatic change.  Abnormality at the left lung base correlates with findings on CT examination of the abdomen and pelvis associated with diaphragmatic hernia and gastric volvulus, referral to the recent CT examination is recommended. Otherwise heart size appears appropriate aortic atherosclerotic change noted.  Accentuation of pulmonary bronchovascular markings consistent with mild diminished depth of inspiration noted. There is no evidence for confluent infiltrate or consolidation, significant pleural effusion or pneumothorax.  Mild atelectatic change noted. The osseous structures demonstrate chronic change.     Radiographic abnormality correlating with findings on recent CT examination referable to diaphragmatic hernia and gastric volvulus. The lungs demonstrate diminished depth of inspiration and mild atelectatic change. Electronically signed by: Nazario Davis Date:    04/12/2024 Time:    02:42    CT Abdomen Pelvis  Without Contrast    Result Date: 4/12/2024  EXAMINATION: CT ABDOMEN PELVIS WITHOUT CONTRAST CLINICAL HISTORY: Epigastric pain;Nausea/vomiting; TECHNIQUE: Low dose axial images, sagittal and coronal reformations were obtained from the lung bases to the pubic symphysis.  Intravenous contrast and oral contrast was not utilized, this diminishes the sensitivity of the examination. COMPARISON: None FINDINGS: The visualized lung bases demonstrate atelectatic change.  There is a small right pleural effusion noted.  Coronary arterial atherosclerotic calcification noted. There is prominent circumferential thickening of the visualized distal thoracic esophagus, this is nonspecific however can be seen with esophagitis.  Endoscopy or upper GI may be helpful for further evaluation. There is complex appearance of herniation at the level of the diaphragmatic hiatus.  The proximal aspect of the stomach appears to  herniate in a fashion typical of a hiatal hernia, however there is appearance organo-axial rotation of the stomach with supradiaphragmatic paraesophageal herniation of a portion of the rotated stomach.  The aforementioned segments of the stomach appear distended with fluid and air.  Distal to the distended segments of stomach there is narrowing of the stomach as it extends inferiorly into the abdominal cavity.  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 There is no evidence for pneumatosis, there is no evidence for mesenteric venous air and there is no evidence for portal venous air.  There is no evidence for free intraperitoneal air. As seen on axial image 66, coronal image 40, and sagittal image 42, there is a finding that is suspicious for a small pancreatic mass lesion involving the junction of the body and tail of the pancreas measuring up to approximately 2.3 cm in size.  Dedicated pancreatic protocol imaging follow-up is recommended when the patient can tolerate the examination. There is no peripancreatic inflammatory change, there is no abnormal pancreatic or biliary ductal dilatation. When accounting for limitations of the exam, there is no evidence for acute process of the liver, gallbladder, spleen, or adrenal glands.  Small adrenal calcification on the left is noted. There is a calculus at the upper pole of the right kidney measuring approximately 7.1 mm in size, nonobstructing.  There is no evidence for ureteral calculus or obstructive uropathy bilaterally.  There is perinephric stranding bilaterally, nonspecific and symmetric may relate to a baseline appearance however correlation for renal disease to include UTI is needed. The abdominal aorta appears normal in caliber, atherosclerotic change noted, otherwise not optimally evaluated on this examination. There is moderate distention of the urinary bladder, there is no  abnormal urinary bladder wall thickening, the appearance is nonspecific however correlation for urinary retention or urinary outlet obstructive symptomatology is needed.  The prostate appears enlarged measuring approximately 6 x 4.2 cm in size. There is a tiny fat density umbilical hernia without bowel involvement.  There is no evidence for small bowel obstructive process.  The appendix is not identified.  Mild air and stool noted throughout the colon without inflammatory or obstructive change. The visualized osseous structures demonstrate chronic change.     Abnormal appearance of the stomach, the configuration of which is thought most consistent with organo-axial rotation with supradiaphragmatic extension and associated obstruction therefore most concerning for gastric volvulus, as discussed above. Circumferential thickening of the distal esophagus, can be evaluated with upper GI or endoscopy. Suspected small pancreatic mass lesion, if previously unknown dedicated pancreatic imaging follow-up when the patient can tolerate is recommended. Bilateral perinephric stranding without obstructive uropathy is nonspecific and may relate to a baseline appearance however correlation for renal disease to include UTI/pyelonephritis is needed. Urinary bladder distention, nonspecific however correlation for bladder outlet obstructive symptomatology or urinary retention is needed. Small right pleural effusion. Additional findings as above. This report was flagged in Epic as abnormal. The findings were discussed with the ER physician at the time of dictation. Electronically signed by: Nazario Davis Date:    04/12/2024 Time:    01:17    CT Abdomen Pelvis  Without Contrast    Result Date: 4/1/2024  CT ABDOMEN PELVIS WITHOUT History: Dysphagia, clinical suspicion for food impaction at the gastroesophageal junction. Comparison: None Findings: Unremarkable lung bases and osseous structures. There is a large paraesophageal hernia with  mild fluid distention of the gastric lumen. There is moderate colonic fecal retention. There is a nonobstructing right intrarenal calculus measuring approximately 6 mm. Remaining upper abdominal viscera demonstrate a normal appearance. Lower abdomen and pelvis demonstrates normal small bowel loops, colon and genitourinary organs.    1. Large paraesophageal hernia. 2. Mild constipation. 3. Nonobstructing right intrarenal calculus measuring approximately 6 mm. This CT examination was performed using one or more of the following dose reduction techniques: Automated exposure control, adjustment of the mA and or kv according to patient size, use of iterative reconstruction techniques. This report was signed by Roger Good MD on 4/1/2024 3:27 AM on the following workstation: [id number].   - pulls last radiology orders

## 2024-04-12 NOTE — PLAN OF CARE
[facility] - Emergency Dept  Initial Discharge Assessment       Primary Care Provider: Connie Mendoza MD    Admission Diagnosis: Intractable nausea and vomiting [R11.2]  Dehydration [E86.0]    Admission Date: 4/11/2024  Expected Discharge Date: 4/16/2024    Transition of Care Barriers: None    Assessment completed at bedside.  Pt intends to discharge home where he lives alone but has the ability to complete adl's without assistance.  Pt denies hh, dme, dialysis, coumadin services or needs prior to admit.    Payor: [employer] / Plan: [facility] [employer] / Product Type: Government /     Extended Emergency Contact Information  Primary Emergency Contact: Yudith Butterfield  Mobile Phone: [phone]  Relation: Daughter  Preferred language: English   needed? No    Discharge Plan A: Home  Discharge Plan B: Home      [facility] - [place], [place] - [address]  [address] [address]  Phone: [phone] Fax: [phone]      Initial Assessment (most recent)       Adult Discharge Assessment - 04/12/24 1319          Discharge Assessment    Assessment Type Discharge Planning Assessment     Confirmed/corrected address, phone number and insurance Yes     Confirmed Demographics Correct on Facesheet     Source of Information patient     Communicated SHAKIR with patient/caregiver Yes     Reason For Admission nausea and vomiting     People in Home alone     Facility Arrived From: home     Do you expect to return to your current living situation? Yes     Do you have help at home or someone to help you manage your care at home? No     Prior to hospitilization cognitive status: Alert/Oriented     Current cognitive status: Alert/Oriented     Walking or Climbing Stairs Difficulty no     Dressing/Bathing Difficulty no     Equipment Currently Used at Home none     Readmission within 30 days? No     Patient currently being followed by outpatient case management? No     Do you currently  have service(s) that help you manage your care at home? No     Do you take prescription medications? Yes     Do you have prescription coverage? Yes     [place]     Do you have any problems affording any of your prescribed medications? No     Is the patient taking medications as prescribed? yes     How do you get to doctors appointments? car, drives self     Are you on dialysis? No     Do you take coumadin? No     Discharge Plan A Home     Discharge Plan B Home     DME Needed Upon Discharge  none     Discharge Plan discussed with: Patient     Transition of Care Barriers None

## 2024-04-12 NOTE — ED PROVIDER NOTES
Encounter Date: 4/11/2024       History     Chief Complaint   Patient presents with    Vomiting    Nausea    Weakness     Pt has been n/v on and off since April 1st. Was given zofran but no longer helping     84-year-old male past medical history of diabetes, reflux, neuropathy, presents emergency department with vomiting.  Patient has had intermittent vomiting for the past several weeks.  He says that he has had difficulty swallowing he feels like food is not really passing.  He says he is vomiting everything up.  He has been seen twice at [facility]  for vomiting.  Has been given referral to GI has been told that he has a hiatal hernia.  Has been unable to get him with GI.  He is continued to vomit.  He says that he is been taking Zofran but it isn't working anymore.  He also was started on Ozempic about 3 months ago and he says he is stopped taking it about 6 weeks ago or more.  Unsure if this is related.  Patient denies having any abdominal pain.      Review of patient's allergies indicates:   Allergen Reactions    Aspirin      Other reaction(s): Ecchymosis    Contrast media     Crestor [rosuvastatin] Other (See Comments)     Cramping in legs at night    Fish oil      Other reaction(s): Ecchymosis    Lipitor [atorvastatin]      Other reaction(s): Muscle pain    Lisinopril      Other reaction(s): Low blood pressure    Lortab 5-325  [hydrocodone-acetaminophen]     Simvastatin      Other reaction(s): Muscle pain     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/2012    Mixed hyperlipidemia 04/16/2018    Sensorineural hearing loss (SNHL) of both ears 07/26/2018    Type 2 diabetes mellitus 04/07/2016    Vitamin B12 deficiency (non anemic) 09/28/2015    Vitamin D  deficiency 04/16/2018     Past Surgical History:   Procedure Laterality Date    EYE SURGERY Right     HEMORRHAGE     Family History   Adopted: Yes     Social History     Tobacco Use    Smoking status: Never    Smokeless tobacco: Former     Quit date: 1981     Review of Systems   Constitutional:  Negative for chills and fever.   HENT:  Negative for congestion and sore throat.    Respiratory:  Negative for shortness of breath.    Cardiovascular:  Negative for chest pain and palpitations.   Gastrointestinal:  Positive for nausea and vomiting. Negative for diarrhea.   Genitourinary:  Negative for dysuria.   Musculoskeletal:  Negative for back pain.   Skin:  Negative for rash.   Neurological:  Negative for weakness and headaches.   Hematological:  Does not bruise/bleed easily.   All other systems reviewed and are negative.      Physical Exam     Initial Vitals [04/11/24 1547]   BP Pulse Resp Temp SpO2   (!) 163/80 75 18 98.1 °F (36.7 °C) 100 %      MAP       --         Physical Exam    Nursing note and vitals reviewed.  Constitutional: He appears well-developed and well-nourished. He is not diaphoretic. No distress.   HENT:   Head: Normocephalic and atraumatic.   Mouth/Throat: No oropharyngeal exudate.   Dry mucous membranes   Eyes: Conjunctivae and EOM are normal. Pupils are equal, round, and reactive to light.   Neck: Neck supple. No tracheal deviation present.   Normal range of motion.  Cardiovascular:  Normal rate, regular rhythm, normal heart sounds and intact distal pulses.           No murmur heard.  Pulmonary/Chest: Breath sounds normal. No stridor. No respiratory distress. He has no wheezes. He has no rhonchi. He has no rales.   Abdominal: Abdomen is soft. Bowel sounds are normal. He exhibits no distension. There is no abdominal tenderness.   No tenderness to palpation There is no rebound and no guarding.   Musculoskeletal:         General: No tenderness or edema. Normal range of motion.      Cervical back:  Normal range of motion and neck supple.     Neurological: He is alert and oriented to person, place, and time. He has normal strength. No cranial nerve deficit or sensory deficit.   Skin: Skin is warm and dry. Capillary refill takes less than 2 seconds. No rash noted. No erythema. No pallor.   Psychiatric: He has a normal mood and affect. His behavior is normal. Thought content normal.         ED Course   Procedures  Labs Reviewed   CBC W/ AUTO DIFFERENTIAL - Abnormal; Notable for the following components:       Result Value    WBC 17.30 (*)     RBC 4.51 (*)     Hemoglobin 13.5 (*)     Immature Granulocytes 0.6 (*)     Gran # (ANC) 15.2 (*)     Immature Grans (Abs) 0.10 (*)     Gran % 87.6 (*)     Lymph % 7.0 (*)     All other components within normal limits   COMPREHENSIVE METABOLIC PANEL - Abnormal; Notable for the following components:    CO2 34 (*)     Glucose 169 (*)     All other components within normal limits   PHOSPHORUS - Abnormal; Notable for the following components:    Phosphorus 2.4 (*)     All other components within normal limits   LACTIC ACID, PLASMA - Abnormal; Notable for the following components:    Lactate (Lactic Acid) 2.9 (*)     All other components within normal limits   CULTURE, BLOOD   CULTURE, BLOOD   LIPASE   MAGNESIUM   URINALYSIS, REFLEX TO URINE CULTURE          Results for orders placed or performed during the hospital encounter of 04/11/24   CBC auto differential   Result Value Ref Range    WBC 17.30 (H) 3.90 - 12.70 K/uL    RBC 4.51 (L) 4.60 - 6.20 M/uL    Hemoglobin 13.5 (L) 14.0 - 18.0 g/dL    Hematocrit 40.2 40.0 - 54.0 %    MCV 89 82 - 98 fL    MCH 29.9 27.0 - 31.0 pg    MCHC 33.6 32.0 - 36.0 g/dL    RDW 13.1 11.5 - 14.5 %    Platelets 350 150 - 450 K/uL    MPV 10.5 9.2 - 12.9 fL    Immature Granulocytes 0.6 (H) 0.0 - 0.5 %    Gran # (ANC) 15.2 (H) 1.8 - 7.7 K/uL    Immature Grans (Abs) 0.10 (H) 0.00 - 0.04 K/uL    Lymph # 1.2 1.0 - 4.8 K/uL    Mono # 0.7 0.3 - 1.0 K/uL    Eos  # 0.0 0.0 - 0.5 K/uL    Baso # 0.12 0.00 - 0.20 K/uL    nRBC 0 0 /100 WBC    Gran % 87.6 (H) 38.0 - 73.0 %    Lymph % 7.0 (L) 18.0 - 48.0 %    Mono % 4.0 4.0 - 15.0 %    Eosinophil % 0.1 0.0 - 8.0 %    Basophil % 0.7 0.0 - 1.9 %    Differential Method Automated    Comprehensive metabolic panel   Result Value Ref Range    Sodium 142 136 - 145 mmol/L    Potassium 3.7 3.5 - 5.1 mmol/L    Chloride 100 95 - 110 mmol/L    CO2 34 (H) 23 - 29 mmol/L    Glucose 169 (H) 70 - 110 mg/dL    BUN 21 8 - 23 mg/dL    Creatinine 1.0 0.5 - 1.4 mg/dL    Calcium 9.3 8.7 - 10.5 mg/dL    Total Protein 6.7 6.0 - 8.4 g/dL    Albumin 3.8 3.5 - 5.2 g/dL    Total Bilirubin 1.0 0.1 - 1.0 mg/dL    Alkaline Phosphatase 84 55 - 135 U/L    AST 14 10 - 40 U/L    ALT 15 10 - 44 U/L    eGFR >60.0 >60 mL/min/1.73 m^2    Anion Gap 8 8 - 16 mmol/L   Lipase   Result Value Ref Range    Lipase 6 4 - 60 U/L   Magnesium   Result Value Ref Range    Magnesium 1.7 1.6 - 2.6 mg/dL   Phosphorus   Result Value Ref Range    Phosphorus 2.4 (L) 2.7 - 4.5 mg/dL   Lactic acid, plasma   Result Value Ref Range    Lactate (Lactic Acid) 2.9 (HH) 0.5 - 1.9 mmol/L       Imaging Results    None          Medications   piperacillin-tazobactam 4.5 g in dextrose 5 % 100 mL IVPB (ready to mix) (4.5 g Intravenous New Bag 4/11/24 3273)   lactated ringers bolus 1,000 mL (has no administration in time range)   lactated ringers bolus 2,178 mL (has no administration in time range)   ondansetron injection 4 mg (4 mg Intravenous Given 4/11/24 1600)   promethazine (PHENERGAN) 12.5 mg in dextrose 5 % (D5W) 50 mL IVPB (0 mg Intravenous Stopped 4/11/24 1347)     Medical Decision Making  84-year-old male presents emergency department with vomiting over the last several weeks.  Patient is now having difficulty swallowing solids can hold down some liquids but continues to gag and vomit.  Overall impression is dehydration and may have esophageal stricture.  I considered sepsis patient does have  criteria for this with leukocytosis and lactic acidosis however I believe this is most likely due to dehydration patient's white count is minimally more elevated than it was several days ago.  Patient has no abdominal tenderness I do not suspect he has not abdominal surgical emergency.  Patient has no respiratory complaints.  Urinalysis still pending at the time of admission.  Patient will be treated with IV Zosyn 30 cc/kilos of IV fluids.  Patient however mostly is dehydrated and vomiting.  Patient may benefit from GI consultation for dilation etcetera.  This could be secondary to his hiatal hernia as well.  Patient will need further inpatient evaluation.    A dictation software program was used for this note.  Please expect some simple typographical  errors in this note.     Amount and/or Complexity of Data Reviewed  External Data Reviewed: labs and notes.  Labs: ordered. Decision-making details documented in ED Course.    Risk  Decision regarding hospitalization.               ED Course as of 04/11/24 2205   Thu Apr 11, 2024 2200 This patient does not have evidence of infective focus  My overall impression is dehydration.  Source: Unknown  Antibiotics given- Antibiotics (72h ago, onward)    Start     Stop Route Frequency Ordered    04/11/24 2130  piperacillin-tazobactam 4.5 g in dextrose 5 % 100 mL IVPB   (ready to mix)         04/12/24 0929 IV ED 1 Time 04/11/24 2122      Latest lactate reviewed-  @[id number](lactate,poclac)@  Organ dysfunction indicated by elevated lactate    Fluid challenge Actual Body weight- Patient will receive 30ml/kg actual body weight to calculate fluid bolus for treatment of septic shock.     Post- resuscitation assessment No - Post resuscitation assessment not needed       Will Not start Pressors- Levophed for MAP of 65  Source control achieved by: IV zosyn   [JR]      ED Course User Index  [JR] Deven Cain DO                           Clinical Impression:  Final  diagnoses:  [R53.1] Generalized weakness  [E86.0] Dehydration (Primary)  [R11.2] Nausea and vomiting, unspecified vomiting type  [K44.9] Hiatal hernia          ED Disposition Condition    Admit Stable                Deven Cain DO  04/11/24 2204       Deven Cain,   04/11/24 2205

## 2024-04-13 ENCOUNTER — ANESTHESIA (OUTPATIENT)
Dept: SURGERY | Facility: HOSPITAL | Age: 84
DRG: 328 | End: 2024-04-13
Payer: OTHER GOVERNMENT

## 2024-04-13 LAB
ALBUMIN SERPL BCP-MCNC: 2.9 G/DL (ref 3.5–5.2)
ALP SERPL-CCNC: 79 U/L (ref 55–135)
ALT SERPL W/O P-5'-P-CCNC: 16 U/L (ref 10–44)
ANION GAP SERPL CALC-SCNC: 12 MMOL/L (ref 8–16)
AST SERPL-CCNC: 9 U/L (ref 10–40)
BASOPHILS # BLD AUTO: 0.14 K/UL (ref 0–0.2)
BASOPHILS NFR BLD: 0.8 % (ref 0–1.9)
BILIRUB SERPL-MCNC: 2 MG/DL (ref 0.1–1)
BUN SERPL-MCNC: 21 MG/DL (ref 8–23)
CALCIUM SERPL-MCNC: 8.6 MG/DL (ref 8.7–10.5)
CANCER AG19-9 SERPL-ACNC: 14.8 U/ML (ref 0–40)
CHLORIDE SERPL-SCNC: 106 MMOL/L (ref 95–110)
CO2 SERPL-SCNC: 27 MMOL/L (ref 23–29)
CREAT SERPL-MCNC: 1.1 MG/DL (ref 0.5–1.4)
DIFFERENTIAL METHOD BLD: ABNORMAL
EOSINOPHIL # BLD AUTO: 0 K/UL (ref 0–0.5)
EOSINOPHIL NFR BLD: 0.1 % (ref 0–8)
ERYTHROCYTE [DISTWIDTH] IN BLOOD BY AUTOMATED COUNT: 13.4 % (ref 11.5–14.5)
EST. GFR  (NO RACE VARIABLE): >60 ML/MIN/1.73 M^2
ESTIMATED AVG GLUCOSE: 177 MG/DL (ref 68–131)
GLUCOSE SERPL-MCNC: 217 MG/DL (ref 70–110)
HBA1C MFR BLD: 7.8 % (ref 4.5–6.2)
HCT VFR BLD AUTO: 35 % (ref 40–54)
HGB BLD-MCNC: 11.7 G/DL (ref 14–18)
IMM GRANULOCYTES # BLD AUTO: 0.08 K/UL (ref 0–0.04)
IMM GRANULOCYTES NFR BLD AUTO: 0.4 % (ref 0–0.5)
LYMPHOCYTES # BLD AUTO: 2.3 K/UL (ref 1–4.8)
LYMPHOCYTES NFR BLD: 12.7 % (ref 18–48)
MAGNESIUM SERPL-MCNC: 1.8 MG/DL (ref 1.6–2.6)
MCH RBC QN AUTO: 30.2 PG (ref 27–31)
MCHC RBC AUTO-ENTMCNC: 33.4 G/DL (ref 32–36)
MCV RBC AUTO: 90 FL (ref 82–98)
MONOCYTES # BLD AUTO: 1 K/UL (ref 0.3–1)
MONOCYTES NFR BLD: 5.8 % (ref 4–15)
NEUTROPHILS # BLD AUTO: 14.3 K/UL (ref 1.8–7.7)
NEUTROPHILS NFR BLD: 80.2 % (ref 38–73)
NRBC BLD-RTO: 0 /100 WBC
PHOSPHATE SERPL-MCNC: 2.3 MG/DL (ref 2.7–4.5)
PLATELET # BLD AUTO: 267 K/UL (ref 150–450)
PMV BLD AUTO: 10.4 FL (ref 9.2–12.9)
POCT GLUCOSE: 199 MG/DL (ref 70–110)
POCT GLUCOSE: 217 MG/DL (ref 70–110)
POCT GLUCOSE: 235 MG/DL (ref 70–110)
POCT GLUCOSE: 243 MG/DL (ref 70–110)
POCT GLUCOSE: 254 MG/DL (ref 70–110)
POCT GLUCOSE: 263 MG/DL (ref 70–110)
POTASSIUM SERPL-SCNC: 3.5 MMOL/L (ref 3.5–5.1)
PROT SERPL-MCNC: 5.9 G/DL (ref 6–8.4)
RBC # BLD AUTO: 3.87 M/UL (ref 4.6–6.2)
SODIUM SERPL-SCNC: 145 MMOL/L (ref 136–145)
WBC # BLD AUTO: 17.81 K/UL (ref 3.9–12.7)

## 2024-04-13 PROCEDURE — 63600175 PHARM REV CODE 636 W HCPCS: Performed by: STUDENT IN AN ORGANIZED HEALTH CARE EDUCATION/TRAINING PROGRAM

## 2024-04-13 PROCEDURE — 0DV44ZZ RESTRICTION OF ESOPHAGOGASTRIC JUNCTION, PERCUTANEOUS ENDOSCOPIC APPROACH: ICD-10-PCS | Performed by: STUDENT IN AN ORGANIZED HEALTH CARE EDUCATION/TRAINING PROGRAM

## 2024-04-13 PROCEDURE — 99900035 HC TECH TIME PER 15 MIN (STAT)

## 2024-04-13 PROCEDURE — 37000008 HC ANESTHESIA 1ST 15 MINUTES: Performed by: STUDENT IN AN ORGANIZED HEALTH CARE EDUCATION/TRAINING PROGRAM

## 2024-04-13 PROCEDURE — 25000003 PHARM REV CODE 250: Performed by: HOSPITALIST

## 2024-04-13 PROCEDURE — 63600175 PHARM REV CODE 636 W HCPCS: Mod: JZ,JG | Performed by: STUDENT IN AN ORGANIZED HEALTH CARE EDUCATION/TRAINING PROGRAM

## 2024-04-13 PROCEDURE — 94761 N-INVAS EAR/PLS OXIMETRY MLT: CPT

## 2024-04-13 PROCEDURE — 63600175 PHARM REV CODE 636 W HCPCS: Performed by: INTERNAL MEDICINE

## 2024-04-13 PROCEDURE — 25000003 PHARM REV CODE 250: Performed by: STUDENT IN AN ORGANIZED HEALTH CARE EDUCATION/TRAINING PROGRAM

## 2024-04-13 PROCEDURE — 8E0W4CZ ROBOTIC ASSISTED PROCEDURE OF TRUNK REGION, PERCUTANEOUS ENDOSCOPIC APPROACH: ICD-10-PCS | Performed by: STUDENT IN AN ORGANIZED HEALTH CARE EDUCATION/TRAINING PROGRAM

## 2024-04-13 PROCEDURE — D9220A PRA ANESTHESIA: Mod: ANES,,, | Performed by: ANESTHESIOLOGY

## 2024-04-13 PROCEDURE — 63600175 PHARM REV CODE 636 W HCPCS: Mod: JZ,JG | Performed by: NURSE ANESTHETIST, CERTIFIED REGISTERED

## 2024-04-13 PROCEDURE — 36000713 HC OR TIME LEV V EA ADD 15 MIN: Performed by: STUDENT IN AN ORGANIZED HEALTH CARE EDUCATION/TRAINING PROGRAM

## 2024-04-13 PROCEDURE — P9045 ALBUMIN (HUMAN), 5%, 250 ML: HCPCS | Mod: JZ,JG | Performed by: NURSE ANESTHETIST, CERTIFIED REGISTERED

## 2024-04-13 PROCEDURE — 36000712 HC OR TIME LEV V 1ST 15 MIN: Performed by: STUDENT IN AN ORGANIZED HEALTH CARE EDUCATION/TRAINING PROGRAM

## 2024-04-13 PROCEDURE — 80053 COMPREHEN METABOLIC PANEL: CPT

## 2024-04-13 PROCEDURE — 84100 ASSAY OF PHOSPHORUS: CPT | Performed by: HOSPITALIST

## 2024-04-13 PROCEDURE — 63600175 PHARM REV CODE 636 W HCPCS: Performed by: HOSPITALIST

## 2024-04-13 PROCEDURE — 94799 UNLISTED PULMONARY SVC/PX: CPT | Mod: XB

## 2024-04-13 PROCEDURE — 27201423 OPTIME MED/SURG SUP & DEVICES STERILE SUPPLY: Performed by: STUDENT IN AN ORGANIZED HEALTH CARE EDUCATION/TRAINING PROGRAM

## 2024-04-13 PROCEDURE — 63600175 PHARM REV CODE 636 W HCPCS

## 2024-04-13 PROCEDURE — C9113 INJ PANTOPRAZOLE SODIUM, VIA: HCPCS

## 2024-04-13 PROCEDURE — 85025 COMPLETE CBC W/AUTO DIFF WBC: CPT

## 2024-04-13 PROCEDURE — C1729 CATH, DRAINAGE: HCPCS | Performed by: STUDENT IN AN ORGANIZED HEALTH CARE EDUCATION/TRAINING PROGRAM

## 2024-04-13 PROCEDURE — 83735 ASSAY OF MAGNESIUM: CPT

## 2024-04-13 PROCEDURE — 43281 LAP PARAESOPHAG HERN REPAIR: CPT | Mod: ,,, | Performed by: STUDENT IN AN ORGANIZED HEALTH CARE EDUCATION/TRAINING PROGRAM

## 2024-04-13 PROCEDURE — 0DS64ZZ REPOSITION STOMACH, PERCUTANEOUS ENDOSCOPIC APPROACH: ICD-10-PCS | Performed by: STUDENT IN AN ORGANIZED HEALTH CARE EDUCATION/TRAINING PROGRAM

## 2024-04-13 PROCEDURE — 99222 1ST HOSP IP/OBS MODERATE 55: CPT | Mod: ,,, | Performed by: INTERNAL MEDICINE

## 2024-04-13 PROCEDURE — 11000001 HC ACUTE MED/SURG PRIVATE ROOM

## 2024-04-13 PROCEDURE — 71000039 HC RECOVERY, EACH ADD'L HOUR: Performed by: STUDENT IN AN ORGANIZED HEALTH CARE EDUCATION/TRAINING PROGRAM

## 2024-04-13 PROCEDURE — 83036 HEMOGLOBIN GLYCOSYLATED A1C: CPT | Performed by: INTERNAL MEDICINE

## 2024-04-13 PROCEDURE — 37000009 HC ANESTHESIA EA ADD 15 MINS: Performed by: STUDENT IN AN ORGANIZED HEALTH CARE EDUCATION/TRAINING PROGRAM

## 2024-04-13 PROCEDURE — 71000033 HC RECOVERY, INTIAL HOUR: Performed by: STUDENT IN AN ORGANIZED HEALTH CARE EDUCATION/TRAINING PROGRAM

## 2024-04-13 PROCEDURE — 88305 TISSUE EXAM BY PATHOLOGIST: CPT | Mod: TC | Performed by: PATHOLOGY

## 2024-04-13 PROCEDURE — 36415 COLL VENOUS BLD VENIPUNCTURE: CPT | Performed by: INTERNAL MEDICINE

## 2024-04-13 PROCEDURE — 25000003 PHARM REV CODE 250: Performed by: NURSE ANESTHETIST, CERTIFIED REGISTERED

## 2024-04-13 PROCEDURE — 43659 UNLISTED LAPS PX STOMACH: CPT | Mod: 51,,, | Performed by: STUDENT IN AN ORGANIZED HEALTH CARE EDUCATION/TRAINING PROGRAM

## 2024-04-13 PROCEDURE — 63600175 PHARM REV CODE 636 W HCPCS: Performed by: NURSE ANESTHETIST, CERTIFIED REGISTERED

## 2024-04-13 PROCEDURE — 36415 COLL VENOUS BLD VENIPUNCTURE: CPT

## 2024-04-13 PROCEDURE — C9113 INJ PANTOPRAZOLE SODIUM, VIA: HCPCS | Performed by: STUDENT IN AN ORGANIZED HEALTH CARE EDUCATION/TRAINING PROGRAM

## 2024-04-13 PROCEDURE — D9220A PRA ANESTHESIA: Mod: CRNA,,, | Performed by: NURSE ANESTHETIST, CERTIFIED REGISTERED

## 2024-04-13 RX ORDER — MEPERIDINE HYDROCHLORIDE 50 MG/ML
12.5 INJECTION INTRAMUSCULAR; INTRAVENOUS; SUBCUTANEOUS ONCE AS NEEDED
Status: ACTIVE | OUTPATIENT
Start: 2024-04-13 | End: 2024-04-14

## 2024-04-13 RX ORDER — PROPOFOL 10 MG/ML
VIAL (ML) INTRAVENOUS
Status: DISCONTINUED | OUTPATIENT
Start: 2024-04-13 | End: 2024-04-13

## 2024-04-13 RX ORDER — ONDANSETRON HYDROCHLORIDE 2 MG/ML
INJECTION, SOLUTION INTRAVENOUS
Status: DISCONTINUED | OUTPATIENT
Start: 2024-04-13 | End: 2024-04-13

## 2024-04-13 RX ORDER — ALBUMIN HUMAN 50 G/1000ML
SOLUTION INTRAVENOUS
Status: DISCONTINUED | OUTPATIENT
Start: 2024-04-13 | End: 2024-04-13

## 2024-04-13 RX ORDER — ROCURONIUM BROMIDE 10 MG/ML
INJECTION, SOLUTION INTRAVENOUS
Status: DISCONTINUED | OUTPATIENT
Start: 2024-04-13 | End: 2024-04-13

## 2024-04-13 RX ORDER — MIDAZOLAM HYDROCHLORIDE 1 MG/ML
INJECTION INTRAMUSCULAR; INTRAVENOUS
Status: DISCONTINUED | OUTPATIENT
Start: 2024-04-13 | End: 2024-04-13

## 2024-04-13 RX ORDER — HYDRALAZINE HYDROCHLORIDE 20 MG/ML
10 INJECTION INTRAMUSCULAR; INTRAVENOUS EVERY 6 HOURS PRN
Status: DISCONTINUED | OUTPATIENT
Start: 2024-04-13 | End: 2024-04-14 | Stop reason: HOSPADM

## 2024-04-13 RX ORDER — OXYCODONE HYDROCHLORIDE 5 MG/1
5 TABLET ORAL
Status: DISCONTINUED | OUTPATIENT
Start: 2024-04-13 | End: 2024-04-14 | Stop reason: HOSPADM

## 2024-04-13 RX ORDER — ACETAMINOPHEN 10 MG/ML
INJECTION, SOLUTION INTRAVENOUS
Status: DISCONTINUED | OUTPATIENT
Start: 2024-04-13 | End: 2024-04-13

## 2024-04-13 RX ORDER — MAGNESIUM SULFATE HEPTAHYDRATE 40 MG/ML
INJECTION, SOLUTION INTRAVENOUS
Status: DISCONTINUED | OUTPATIENT
Start: 2024-04-13 | End: 2024-04-13

## 2024-04-13 RX ORDER — DIPHENHYDRAMINE HYDROCHLORIDE 50 MG/ML
12.5 INJECTION INTRAMUSCULAR; INTRAVENOUS EVERY 6 HOURS PRN
Status: DISCONTINUED | OUTPATIENT
Start: 2024-04-13 | End: 2024-04-14 | Stop reason: HOSPADM

## 2024-04-13 RX ORDER — ONDANSETRON 2 MG/ML
INJECTION INTRAMUSCULAR; INTRAVENOUS
Status: DISCONTINUED | OUTPATIENT
Start: 2024-04-13 | End: 2024-04-13

## 2024-04-13 RX ORDER — ONDANSETRON HYDROCHLORIDE 2 MG/ML
4 INJECTION, SOLUTION INTRAVENOUS ONCE AS NEEDED
Status: DISCONTINUED | OUTPATIENT
Start: 2024-04-13 | End: 2024-04-14 | Stop reason: HOSPADM

## 2024-04-13 RX ORDER — EPHEDRINE SULFATE 50 MG/ML
INJECTION, SOLUTION INTRAVENOUS
Status: DISCONTINUED | OUTPATIENT
Start: 2024-04-13 | End: 2024-04-13

## 2024-04-13 RX ORDER — INSULIN ASPART 100 [IU]/ML
0-5 INJECTION, SOLUTION INTRAVENOUS; SUBCUTANEOUS EVERY 6 HOURS PRN
Status: DISCONTINUED | OUTPATIENT
Start: 2024-04-13 | End: 2024-04-14 | Stop reason: HOSPADM

## 2024-04-13 RX ORDER — LIDOCAINE HYDROCHLORIDE 20 MG/ML
INJECTION INTRAVENOUS
Status: DISCONTINUED | OUTPATIENT
Start: 2024-04-13 | End: 2024-04-13

## 2024-04-13 RX ORDER — FENTANYL CITRATE 50 UG/ML
25 INJECTION, SOLUTION INTRAMUSCULAR; INTRAVENOUS EVERY 5 MIN PRN
Status: DISCONTINUED | OUTPATIENT
Start: 2024-04-13 | End: 2024-04-14 | Stop reason: HOSPADM

## 2024-04-13 RX ORDER — GLUCAGON 1 MG
1 KIT INJECTION
Status: DISCONTINUED | OUTPATIENT
Start: 2024-04-13 | End: 2024-04-14 | Stop reason: HOSPADM

## 2024-04-13 RX ORDER — HYDROMORPHONE HYDROCHLORIDE 2 MG/ML
0.2 INJECTION, SOLUTION INTRAMUSCULAR; INTRAVENOUS; SUBCUTANEOUS EVERY 5 MIN PRN
Status: DISCONTINUED | OUTPATIENT
Start: 2024-04-13 | End: 2024-04-14 | Stop reason: HOSPADM

## 2024-04-13 RX ORDER — FENTANYL CITRATE 50 UG/ML
INJECTION, SOLUTION INTRAMUSCULAR; INTRAVENOUS
Status: DISCONTINUED | OUTPATIENT
Start: 2024-04-13 | End: 2024-04-13

## 2024-04-13 RX ORDER — PHENYLEPHRINE HYDROCHLORIDE 10 MG/ML
INJECTION INTRAVENOUS
Status: DISCONTINUED | OUTPATIENT
Start: 2024-04-13 | End: 2024-04-13

## 2024-04-13 RX ORDER — BUPIVACAINE HYDROCHLORIDE 2.5 MG/ML
INJECTION, SOLUTION EPIDURAL; INFILTRATION; INTRACAUDAL
Status: DISCONTINUED | OUTPATIENT
Start: 2024-04-13 | End: 2024-04-13 | Stop reason: HOSPADM

## 2024-04-13 RX ORDER — DEXAMETHASONE SODIUM PHOSPHATE 4 MG/ML
INJECTION, SOLUTION INTRA-ARTICULAR; INTRALESIONAL; INTRAMUSCULAR; INTRAVENOUS; SOFT TISSUE
Status: DISCONTINUED | OUTPATIENT
Start: 2024-04-13 | End: 2024-04-13

## 2024-04-13 RX ORDER — ONDANSETRON HYDROCHLORIDE 2 MG/ML
8 INJECTION, SOLUTION INTRAVENOUS EVERY 4 HOURS PRN
Status: DISCONTINUED | OUTPATIENT
Start: 2024-04-13 | End: 2024-04-14 | Stop reason: HOSPADM

## 2024-04-13 RX ORDER — SUCCINYLCHOLINE CHLORIDE 20 MG/ML
INJECTION INTRAMUSCULAR; INTRAVENOUS
Status: DISCONTINUED | OUTPATIENT
Start: 2024-04-13 | End: 2024-04-13

## 2024-04-13 RX ADMIN — ROCURONIUM BROMIDE 45 MG: 10 INJECTION, SOLUTION INTRAVENOUS at 01:04

## 2024-04-13 RX ADMIN — INSULIN ASPART 2 UNITS: 100 INJECTION, SOLUTION INTRAVENOUS; SUBCUTANEOUS at 05:04

## 2024-04-13 RX ADMIN — MUPIROCIN 1 G: 20 OINTMENT TOPICAL at 08:04

## 2024-04-13 RX ADMIN — PHENYLEPHRINE HYDROCHLORIDE 200 MCG: 10 INJECTION INTRAVENOUS at 01:04

## 2024-04-13 RX ADMIN — PHENYLEPHRINE HYDROCHLORIDE 100 MCG: 10 INJECTION INTRAVENOUS at 01:04

## 2024-04-13 RX ADMIN — PHENYLEPHRINE HYDROCHLORIDE 200 MCG: 10 INJECTION INTRAVENOUS at 02:04

## 2024-04-13 RX ADMIN — ALBUMIN (HUMAN) 250 ML: 12.5 SOLUTION INTRAVENOUS at 01:04

## 2024-04-13 RX ADMIN — FENTANYL CITRATE 50 MCG: 50 INJECTION, SOLUTION INTRAMUSCULAR; INTRAVENOUS at 01:04

## 2024-04-13 RX ADMIN — DEXAMETHASONE SODIUM PHOSPHATE 4 MG: 4 INJECTION, SOLUTION INTRA-ARTICULAR; INTRALESIONAL; INTRAMUSCULAR; INTRAVENOUS; SOFT TISSUE at 03:04

## 2024-04-13 RX ADMIN — SODIUM CHLORIDE AND POTASSIUM CHLORIDE: .9; .15 SOLUTION INTRAVENOUS at 07:04

## 2024-04-13 RX ADMIN — PROPOFOL 150 MG: 10 INJECTION, EMULSION INTRAVENOUS at 01:04

## 2024-04-13 RX ADMIN — MAGNESIUM SULFATE 2 G: 2 INJECTION INTRAVENOUS at 01:04

## 2024-04-13 RX ADMIN — DEXAMETHASONE SODIUM PHOSPHATE 4 MG: 4 INJECTION, SOLUTION INTRA-ARTICULAR; INTRALESIONAL; INTRAMUSCULAR; INTRAVENOUS; SOFT TISSUE at 01:04

## 2024-04-13 RX ADMIN — MIDAZOLAM HYDROCHLORIDE 2 MG: 1 INJECTION INTRAMUSCULAR; INTRAVENOUS at 01:04

## 2024-04-13 RX ADMIN — SUCCINYLCHOLINE CHLORIDE 140 MG: 20 INJECTION, SOLUTION INTRAMUSCULAR; INTRAVENOUS at 01:04

## 2024-04-13 RX ADMIN — ONDANSETRON 4 MG: 2 INJECTION INTRAMUSCULAR; INTRAVENOUS at 01:04

## 2024-04-13 RX ADMIN — CALCIUM CHLORIDE 0.5 G: 100 INJECTION, SOLUTION INTRAVENOUS at 01:04

## 2024-04-13 RX ADMIN — ALBUMIN (HUMAN) 250 ML: 12.5 SOLUTION INTRAVENOUS at 02:04

## 2024-04-13 RX ADMIN — LIDOCAINE HYDROCHLORIDE 100 MG: 20 INJECTION, SOLUTION INTRAVENOUS at 01:04

## 2024-04-13 RX ADMIN — INSULIN ASPART 1 UNITS: 100 INJECTION, SOLUTION INTRAVENOUS; SUBCUTANEOUS at 08:04

## 2024-04-13 RX ADMIN — ROCURONIUM BROMIDE 20 MG: 10 INJECTION, SOLUTION INTRAVENOUS at 02:04

## 2024-04-13 RX ADMIN — EPHEDRINE SULFATE 15 MG: 50 INJECTION, SOLUTION INTRAMUSCULAR; INTRAVENOUS; SUBCUTANEOUS at 02:04

## 2024-04-13 RX ADMIN — SUGAMMADEX 200 MG: 100 INJECTION, SOLUTION INTRAVENOUS at 03:04

## 2024-04-13 RX ADMIN — PANTOPRAZOLE SODIUM 40 MG: 40 INJECTION, POWDER, LYOPHILIZED, FOR SOLUTION INTRAVENOUS at 09:04

## 2024-04-13 RX ADMIN — ROCURONIUM BROMIDE 5 MG: 10 INJECTION, SOLUTION INTRAVENOUS at 01:04

## 2024-04-13 RX ADMIN — PIPERACILLIN AND TAZOBACTAM 3.38 G: 3; .375 INJECTION, POWDER, LYOPHILIZED, FOR SOLUTION INTRAVENOUS; PARENTERAL at 08:04

## 2024-04-13 RX ADMIN — MUPIROCIN 1 G: 20 OINTMENT TOPICAL at 09:04

## 2024-04-13 RX ADMIN — FENTANYL CITRATE 100 MCG: 50 INJECTION, SOLUTION INTRAMUSCULAR; INTRAVENOUS at 01:04

## 2024-04-13 RX ADMIN — FENTANYL CITRATE 50 MCG: 50 INJECTION, SOLUTION INTRAMUSCULAR; INTRAVENOUS at 02:04

## 2024-04-13 RX ADMIN — SODIUM CHLORIDE, SODIUM GLUCONATE, SODIUM ACETATE, POTASSIUM CHLORIDE, MAGNESIUM CHLORIDE, SODIUM PHOSPHATE, DIBASIC, AND POTASSIUM PHOSPHATE: .53; .5; .37; .037; .03; .012; .00082 INJECTION, SOLUTION INTRAVENOUS at 01:04

## 2024-04-13 RX ADMIN — ACETAMINOPHEN 1000 MG: 10 INJECTION, SOLUTION INTRAVENOUS at 02:04

## 2024-04-13 RX ADMIN — PANTOPRAZOLE SODIUM 40 MG: 40 INJECTION, POWDER, LYOPHILIZED, FOR SOLUTION INTRAVENOUS at 08:04

## 2024-04-13 RX ADMIN — PIPERACILLIN AND TAZOBACTAM 3.38 G: 3; .375 INJECTION, POWDER, LYOPHILIZED, FOR SOLUTION INTRAVENOUS; PARENTERAL at 03:04

## 2024-04-13 RX ADMIN — INSULIN ASPART 1 UNITS: 100 INJECTION, SOLUTION INTRAVENOUS; SUBCUTANEOUS at 12:04

## 2024-04-13 RX ADMIN — PROPOFOL 50 MG: 10 INJECTION, EMULSION INTRAVENOUS at 01:04

## 2024-04-13 RX ADMIN — PIPERACILLIN AND TAZOBACTAM 3.38 G: 3; .375 INJECTION, POWDER, LYOPHILIZED, FOR SOLUTION INTRAVENOUS; PARENTERAL at 12:04

## 2024-04-13 NOTE — CONSULTS
[facility]/Surg  Adult Nutrition  Consult Note    SUMMARY     Recommendations    Recommendation/Intervention:   1. Initiate pt onto continuous Enteral nutrition, recommemend Diabetisource AC via NG tube, goal rate 65 mL, starting at 10 mL/hr, then advance to goal within 24 hrs if pt tolerating or per MD/NP   -Formula at goal rate provides: 1872 kcals/day  (100% EEN), 94 g protein/day (109% EPN), 1273 mL free formula water/day (68% fluid needs)   -100 mL q4h free water flushes (600 mL/day) = total from formula + FWF = 1873 mL water/day (100% fluid needs), per MD/NP   -Check Mg, K+, Na, Phos and Glu before and during initiation, correct as indicated   2. Recommend antinausea/ antiemetic medication as warranted   3. Weigh twice weekly    Goals:   1. Pt will be initiated onto EN within 24 hrs   2. Pt will toelrate and intake > 75% EEN and EPN prior to RD follow  3. Pt's N/V will improve by RD follow up   Nutrition Goal Status: new  Communication of RD Recs: other (comment) (POC, sticky note, secure chat)    Assessment and Plan    Nutrition Problem  Inadequate oral intake   Altered GI function     Related to (etiology):   Decreased ability to consume sufficient protein/energy   Alteration in GI tract structure and/or function     Signs and Symptoms (as evidenced by):   NPO   N/V    Interventions/Recommendations (treatment strategy):  1. Enteral nutrition   2. Prescription medication   3. Collaboration with other providers     Nutrition Diagnosis Status:   New       Malnutrition Assessment     Skin (Micronutrient): dry, bruised, wounds unhealed (Andrea score = 17 (mild risk)                                 Reason for Assessment    Reason For Assessment: consult, new tube feeding (NG TUBE)  Diagnosis:  (Intractable nausea and vomiting)  Relevant Medical History: Leukocytosis, Open wound R hand, T2DM, GERD  General Information Comments:   4/13/24: 84 y.o. Male admitted for Intractable nausea and vomiting.  "RD remote for coverage. Pt currently NPO x 1 day. RD consulted for NG tube feed recommendations. H&P noted that the pt presented to the ED  for eval of intractable NV and abd pain, pt reported he has had NV and epigastric pain for several weeks w/ inability to tolerate PO intake, this is his third ED visit in 2 weeks,  he has been taking zofran at home w/out relief, pt started on Ozempic x ~ 3 months ago and stopped taking it about 6+ weeks, pt denied coffee ground emesis, hematochezia or melena, pt reported intermittent soft and hard BM's. CT abd/pelvis to be repeated d/t previous imaging with constipation and intrarenal calculus, concern for infection, and severity of sxs. Informed RN of TF recs via secure chat. Reviewed chart: Propofol: 0; Total VE: 0; LBM 4/10; Skin: dry, scattered brusies and scabs; Andrea score: 17 (mild risk); Edema: None. Labs, meds, weight reviewed. Weight charted 4/12 154 lbs (BMI 22.79, Underweight for age), no current previous weights charted. Unable to perform NFPE d/t RD remote, will perform at follow up. RD will continue to follow and monitor pt's nutritional status as warranted.    Nutrition Discharge Planning: Diabetic diet    Nutrition Risk Screen    Nutrition Risk Screen: tube feeding or parenteral nutrition    Nutrition Related Social Determinants of Health: SDOH: Unable to assess at this time.       Nutrition/Diet History    Spiritual, Cultural Beliefs, Yarsani Practices, Values that Affect Care: no  Food Allergies: other (see comments) (Fish oil)  Factors Affecting Nutritional Intake: altered gastrointestinal function, nausea/vomiting, NPO    Anthropometrics    Temp: 98.1 °F (36.7 °C)  Height: 5' 9" (175.3 cm)  Height (inches): 69 in  Weight Method: Bed Scale  Weight: 70 kg (154 lb 5.2 oz)  Weight (lb): 154.32 lb  Ideal Body Weight (IBW), Male: 160 lb  % Ideal Body Weight, Male (lb): 96.45 %  BMI (Calculated): 22.8  BMI Grade: 18.5-24.9 - normal (Underweight for age)     Wt " "Readings from Last 15 Encounters:   04/13/24 70 kg (154 lb 5.2 oz)   05/10/22 87.1 kg (192 lb)   10/12/21 87.1 kg (192 lb)   07/14/21 87.1 kg (192 lb)   12/29/20 79.4 kg (175 lb)   11/12/20 79.4 kg (175 lb)     Lab/Procedures/Meds    Pertinent Labs Reviewed: reviewed  Pertinent Labs Comments: Calcium (L), Total protein (L), Albumin (L), AST (L), Phos (L), Glu (H), Total bilirubin (H)  Pertinent Medications Reviewed: reviewed  Pertinent Medications Comments: Zosyn in D5W, pantoprazole, Insulin    BMP  Lab Results   Component Value Date     04/13/2024    K 3.5 04/13/2024     04/13/2024    CO2 27 04/13/2024    BUN 21 04/13/2024    CREATININE 1.1 04/13/2024    CALCIUM 8.6 (L) 04/13/2024    ANIONGAP 12 04/13/2024    EGFRNORACEVR >60 04/13/2024     Lab Results   Component Value Date    CALCIUM 8.6 (L) 04/13/2024    PHOS 2.3 (L) 04/13/2024     Lab Results   Component Value Date    ALBUMIN 2.9 (L) 04/13/2024     Lab Results   Component Value Date    ALT 16 04/13/2024    AST 9 (L) 04/13/2024    ALKPHOS 79 04/13/2024    BILITOT 2.0 (H) 04/13/2024     Recent Labs   Lab 04/13/24  0814   POCTGLUCOSE 217*     No results found for: "LABA1C", "HGBA1C"  Lab Results   Component Value Date    WBC 17.81 (H) 04/13/2024    HGB 11.7 (L) 04/13/2024    HCT 35.0 (L) 04/13/2024    MCV 90 04/13/2024     04/13/2024     Scheduled Meds:  Current Facility-Administered Medications   Medication Dose Route Frequency Provider Last Rate Last Admin    0.9 % NaCl with KCl 20 mEq infusion   Intravenous Continuous Fatou Rowley  mL/hr at 04/13/24 0744 Rate Verify at 04/13/24 0744    dextrose 10% bolus 125 mL 125 mL  12.5 g Intravenous PRN Mica Goins MD        dextrose 10% bolus 250 mL 250 mL  25 g Intravenous PRN Mica Goins MD        glucagon (human recombinant) injection 1 mg  1 mg Intramuscular PRN Love Huang NP        glucose chewable tablet 16 g  16 g Oral PRN Love Huang NP        glucose " chewable tablet 24 g  24 g Oral PRN DerLove mohan M, NP        hydrALAZINE injection 10 mg  10 mg Intravenous Q6H PRN Zain Marvin MD        insulin aspart U-100 pen 0-5 Units  0-5 Units Subcutaneous Q6H DerninoskasLove M, NP   1 Units at 04/13/24 0006    melatonin tablet 6 mg  6 mg Oral Nightly PRN Love Huang, NP        mupirocin 2 % ointment   Nasal BID Fatou Rowley MD   1 g at 04/13/24 0904    naloxone 0.4 mg/mL injection 0.02 mg  0.02 mg Intravenous PRN DerbinsLove, NP        ondansetron injection 4 mg  4 mg Intravenous Q4H PRN Love Huang NP   4 mg at 04/12/24 0137    pantoprazole injection 40 mg  40 mg Intravenous BID DerbinLove bustamante, NP   40 mg at 04/13/24 0904    piperacillin-tazobactam (ZOSYN) 3.375 g in dextrose 5 % in water (D5W) 100 mL IVPB (MB+)  3.375 g Intravenous Q8H Mica Goins MD   Stopped at 04/13/24 0759    promethazine (PHENERGAN) 25 mg in dextrose 5 % (D5W) 50 mL IVPB  25 mg Intravenous Q6H PRN Love Huang, NP        sodium chloride 0.9% flush 10 mL  10 mL Intravenous Q12H PRN Love Huang NP         Continuous Infusions:  Current Facility-Administered Medications   Medication Dose Route Frequency Provider Last Rate Last Admin    0.9 % NaCl with KCl 20 mEq infusion   Intravenous Continuous Fatou Rowley  mL/hr at 04/13/24 0744 Rate Verify at 04/13/24 0744    dextrose 10% bolus 125 mL 125 mL  12.5 g Intravenous PRN Mica Goins MD        dextrose 10% bolus 250 mL 250 mL  25 g Intravenous PRN Mica Goins MD        glucagon (human recombinant) injection 1 mg  1 mg Intramuscular PRN Love Huang M, NP        glucose chewable tablet 16 g  16 g Oral PRN Love Huang M, NP        glucose chewable tablet 24 g  24 g Oral PRN Love Huang, ELIAS        hydrALAZINE injection 10 mg  10 mg Intravenous Q6H PRN Zain Marvin MD        insulin aspart U-100 pen 0-5 Units  0-5 Units Subcutaneous Q6H Love Huang NP   1 Units at  04/13/24 0006    melatonin tablet 6 mg  6 mg Oral Nightly PRN Love Huang NP        mupirocin 2 % ointment   Nasal BID Fatou Rowley MD   1 g at 04/13/24 0904    naloxone 0.4 mg/mL injection 0.02 mg  0.02 mg Intravenous PRN Love Huang NP        ondansetron injection 4 mg  4 mg Intravenous Q4H PRN Love Huang NP   4 mg at 04/12/24 0137    pantoprazole injection 40 mg  40 mg Intravenous BID DerbinLove bustamante NP   40 mg at 04/13/24 0904    piperacillin-tazobactam (ZOSYN) 3.375 g in dextrose 5 % in water (D5W) 100 mL IVPB (MB+)  3.375 g Intravenous Q8H Mica Goins MD   Stopped at 04/13/24 0759    promethazine (PHENERGAN) 25 mg in dextrose 5 % (D5W) 50 mL IVPB  25 mg Intravenous Q6H PRN Love Huang NP        sodium chloride 0.9% flush 10 mL  10 mL Intravenous Q12H PRN Love Huang NP         PRN Meds:.  Current Facility-Administered Medications   Medication Dose Route Frequency Provider Last Rate Last Admin    0.9 % NaCl with KCl 20 mEq infusion   Intravenous Continuous Fatou Rowley  mL/hr at 04/13/24 0744 Rate Verify at 04/13/24 0744    dextrose 10% bolus 125 mL 125 mL  12.5 g Intravenous PRN Mica oGins MD        dextrose 10% bolus 250 mL 250 mL  25 g Intravenous PRN Mica Goins MD        glucagon (human recombinant) injection 1 mg  1 mg Intramuscular PRN Olga Lidia Huangela M, NP        glucose chewable tablet 16 g  16 g Oral PRN Daryls Love M, NP        glucose chewable tablet 24 g  24 g Oral PRN Daryls, Love M, NP        hydrALAZINE injection 10 mg  10 mg Intravenous Q6H PRN Zain Marvin MD        insulin aspart U-100 pen 0-5 Units  0-5 Units Subcutaneous Q6H DerninoskasLove M, NP   1 Units at 04/13/24 0006    melatonin tablet 6 mg  6 mg Oral Nightly PRN Love Huang, NP        mupirocin 2 % ointment   Nasal BID Fatou Rowley MD   1 g at 04/13/24 0904    naloxone 0.4 mg/mL injection 0.02 mg  0.02 mg Intravenous PRN Love Huang, NP         ondansetron injection 4 mg  4 mg Intravenous Q4H PRN Love Huang NP   4 mg at 04/12/24 0137    pantoprazole injection 40 mg  40 mg Intravenous BID Love Huang NP   40 mg at 04/13/24 0904    piperacillin-tazobactam (ZOSYN) 3.375 g in dextrose 5 % in water (D5W) 100 mL IVPB (MB+)  3.375 g Intravenous Q8H Mica Goins MD   Stopped at 04/13/24 0759    promethazine (PHENERGAN) 25 mg in dextrose 5 % (D5W) 50 mL IVPB  25 mg Intravenous Q6H PRN Love Huang NP        sodium chloride 0.9% flush 10 mL  10 mL Intravenous Q12H PRN Love Huang NP           Physical Findings/Assessment         Estimated/Assessed Needs    Weight Used For Calorie Calculations: 70 kg (154 lb 5.2 oz)  Energy Calorie Requirements (kcal): 2947-0523 kcals (MSJ x 1.2-1.4 AF (GI Disorder vs Underweight for age)  Energy Need Method: Kcal/kg  Protein Requirements: 70-86 g (1.0-1.23 g/kg ABW (GI Disorder, older adult vs Underweight for age)  Weight Used For Protein Calculations: 70 kg (154 lb 5.2 oz)  Fluid Requirements (mL): 1488-6331 mL (1 mL/kcal)  Estimated Fluid Requirement Method: RDA Method  RDA Method (mL): 1662  CHO Requirement: 207-242 g (6343-8242 kcals/8)      Nutrition Prescription Ordered    Current Diet Order: NPO    Evaluation of Received Nutrient/Fluid Intake  I/O: (Net since admit)   4/13: +3021.5 mL    Enteral Calories (kcal): 0  Enteral Protein (gm): 0  Enteral (Free Water) Fluid (mL): 0  Free Water Flush Fluid (mL): 0  % Kcal Needs: 0%  % Protein Needs: 0%    Energy Calories Required: not meeting needs  Protein Required: not meeting needs  Fluid Required: not meeting needs  Total Fluid Intake (mL): 250  Tolerance:  (Currently no intake)  % Intake of Estimated Energy Needs: 0 - 25 %  % Meal Intake: NPO    Nutrition Risk    Level of Risk/Frequency of Follow-up: high (F/u x 2 weekly)       Monitor and Evaluation    Food and Nutrient Intake: energy intake, enteral nutrition intake  Food and Nutrient  Adminstration: enteral and parenteral nutrition administration  Knowledge/Beliefs/Attitudes: food and nutrition knowledge/skill, beliefs and attitudes  Anthropometric Measurements: weight, weight change, body mass index  Biochemical Data, Medical Tests and Procedures: electrolyte and renal panel, glucose/endocrine profile, gastrointestinal profile       Nutrition Follow-Up    RD Follow-up?: Yes  Carol Grimaldo, Registration Eligible, Provisional LDN

## 2024-04-13 NOTE — CONSULTS
[facility]     CC: N/V    HPI 84 y.o. male presented to ED for eval of intractable NV and abd pain. Patient reported he has had NV and epigastric pain for several weeks, with inability to tolerate PO. Reported this is his third ED visit in 2 weeks. On 4/1/24 CT abd/pelvis at [facility] with large paraesophageal hernia, constipation, nonobstructing right intrarenal calculus measuring approximately 6 mm. Patient reported he was instructed to follow up with GI for the hernia but [facility] and was told to go to the ED. Patient does also report many years ago he did have EGD with dil. Reported he has been taking zofran at home without relief. He also was started on Ozempic about 3 months ago and he says he is stopped taking it about 6 weeks ago or more. On my exam, patient experienced forceful retching with copious amounts of brown bile. He denies coffee ground emesis, denies hematochezia or melena. Reports intermittent soft and hard bowel movements. Patient noted to lave leukocytosis and elevated lactic acid level in ED. Patient does report chills at home. Given zosyn and IVF. Lipase unremarkable. Given previous imaging with constipation and intrarenal calculus, concern for infection, and severity of symptoms will repeat CT abd/pelvis. Admit to hospital medicine for further eval.      Overview/Hospital Course:  No notes on file     Interval History:   84-year-old male with history of diabetes hypertension, hiatal hernia presenting here for recurrent nausea vomiting abdominal pain.  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  Patient has NG tube placement has significant output.      Seen in the multidisciplinary rounds, patient is still on NG tube with intermittent suction, has large amount greenish fluid come out, no fever or chills, GI and general surgery has been consulted and pending evaluation.    FURTHER HISTORY:  Above obtained from  independent review of records from admitting provider as well as from direct discussion with Dr. Pryor who plans on surgery today.  In addition, on my interview, I note the following:  Patient presented with abdominal pain, recent onset, associated with intractable N/V, now relieved with NGT placement.  CT reviewed and showed evidence of large HH with gastric volvulus.  No history of EGD.  States that he had a colonoscopy about 4 years ago.      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/2012    Mixed hyperlipidemia 04/16/2018    Sensorineural hearing loss (SNHL) of both ears 07/26/2018    Type 2 diabetes mellitus 04/07/2016    Vitamin B12 deficiency (non anemic) 09/28/2015    Vitamin D deficiency 04/16/2018       Past Surgical History:   Procedure Laterality Date    EYE SURGERY Right     HEMORRHAGE       Social History     Tobacco Use    Smoking status: Never    Smokeless tobacco: Former     Quit date: 1981       Family History   Adopted: Yes       Review of Systems  General ROS: negative for - chills, fever or weight loss  Psychological ROS: negative for - hallucination, depression or suicidal ideation  Ophthalmic ROS: negative for - blurry vision, photophobia or eye pain  ENT ROS: negative for - epistaxis, sore throat or rhinorrhea  Respiratory ROS: no cough, shortness of breath, or wheezing  Cardiovascular ROS: no chest pain or dyspnea on exertion  Gastrointestinal ROS: as above  Genito-Urinary ROS: no dysuria, trouble voiding, or hematuria  Musculoskeletal ROS: negative for - arthralgia, myalgia, weakness  Neurological ROS: no syncope or seizures; no ataxia  Dermatological ROS: negative for pruritis, rash and jaundice    Physical Examination  BP (!) 159/74 (BP  "Location: Left arm)   Pulse 80   Temp 99.5 °F (37.5 °C) (Temporal)   Resp 16   Ht 5' 9" (1.753 m)   Wt 70 kg (154 lb 5.2 oz)   SpO2 95%   BMI 22.79 kg/m²   General appearance: alert, cooperative, no distress  HENT: Normocephalic, atraumatic, neck symmetrical, no nasal discharge NGT in place  Eyes: conjunctivae/corneas clear, PERRL, EOM's intact, sclera anicteric  Lungs: clear to auscultation bilaterally, no dullness to percussion bilaterally, symmetric expansion, breathing unlabored  Heart: regular rate and rhythm without rub; no displacement of the PMI   Abdomen: soft, NT  Extremities: extremities symmetric; no clubbing, cyanosis, or edema  Integument: Skin color, texture, turgor normal; no rashes; hair distrubution normal, no jaundice  Neurologic: Alert and oriented X 3, no focal sensory or motor neurologic deficits  Psychiatric: no pressured speech; normal affect; no evidence of impaired cognition, no anxiety/depression     Labs:  Lab Results   Component Value Date    WBC 17.81 (H) 04/13/2024    HGB 11.7 (L) 04/13/2024    HCT 35.0 (L) 04/13/2024    MCV 90 04/13/2024     04/13/2024         CMP  Sodium   Date Value Ref Range Status   04/13/2024 145 136 - 145 mmol/L Final     Potassium   Date Value Ref Range Status   04/13/2024 3.5 3.5 - 5.1 mmol/L Final     Chloride   Date Value Ref Range Status   04/13/2024 106 95 - 110 mmol/L Final     CO2   Date Value Ref Range Status   04/13/2024 27 23 - 29 mmol/L Final     Glucose   Date Value Ref Range Status   04/13/2024 217 (H) 70 - 110 mg/dL Final     BUN   Date Value Ref Range Status   04/13/2024 21 8 - 23 mg/dL Final     Creatinine   Date Value Ref Range Status   04/13/2024 1.1 0.5 - 1.4 mg/dL Final     Calcium   Date Value Ref Range Status   04/13/2024 8.6 (L) 8.7 - 10.5 mg/dL Final     Total Protein   Date Value Ref Range Status   04/13/2024 5.9 (L) 6.0 - 8.4 g/dL Final     Albumin   Date Value Ref Range Status   04/13/2024 2.9 (L) 3.5 - 5.2 g/dL Final "     Total Bilirubin   Date Value Ref Range Status   04/13/2024 2.0 (H) 0.1 - 1.0 mg/dL Final     Comment:     For infants and newborns, interpretation of results should be based  on gestational age, weight and in agreement with clinical  observations.    Premature Infant recommended reference ranges:  Up to 24 hours.............<8.0 mg/dL  Up to 48 hours............<12.0 mg/dL  3-5 days..................<15.0 mg/dL  6-29 days.................<15.0 mg/dL       Alkaline Phosphatase   Date Value Ref Range Status   04/13/2024 79 55 - 135 U/L Final     AST   Date Value Ref Range Status   04/13/2024 9 (L) 10 - 40 U/L Final     ALT   Date Value Ref Range Status   04/13/2024 16 10 - 44 U/L Final     Anion Gap   Date Value Ref Range Status   04/13/2024 12 8 - 16 mmol/L Final     eGFR   Date Value Ref Range Status   04/13/2024 >60 >60 mL/min/1.73 m^2 Final         Imaging:  CT scan was independently visualized and reviewed by me and showed fndings as above in HPI.    I have personally reviewed these images    Case discussed as above.    Assessment:   Abdominal pain  N/V  Gastric volvulus    Plan:  To OR per Dr. Pryor  Outpatient follow up with GI.  Will sign off for now.      Nilesh Woodard MD  [facility]  [address], [address]  [place] [address]  Office: [phone]  Fax: [phone]

## 2024-04-13 NOTE — ANESTHESIA PROCEDURE NOTES
Intubation    Date/Time: 4/13/2024 1:28 PM    Performed by: Viridiana Mcnamara CRNA  Authorized by: Castillo Lamar MD    Intubation:     Induction:  Intravenous    Intubated:  Postinduction    Mask Ventilation:  Easy mask    Attempts:  1    Attempted By:  CRNA    Method of Intubation:  Video laryngoscopy    Blade:  Cruz 3    Laryngeal View Grade: Grade I - full view of cords      Difficult Airway Encountered?: No      Complications:  None    Airway Device:  Oral endotracheal tube    Airway Device Size:  7.5    Style/Cuff Inflation:  Cuffed (inflated to minimal occlusive pressure)    Inflation Amount (mL):  5    Tube secured:  22    Secured at:  The lips    Placement Verified By:  Capnometry and Revisualization with laryngoscopy    Complicating Factors:  None    Findings Post-Intubation:  BS equal bilateral and atraumatic/condition of teeth unchanged

## 2024-04-13 NOTE — ANESTHESIA PREPROCEDURE EVALUATION
04/13/2024  Syd Butterfield Jr. is a 84 y.o., male.      Pre-op Assessment    I have reviewed the Patient Summary Reports.     I have reviewed the Nursing Notes. I have reviewed the NPO Status.   I have reviewed the Medications.     Review of Systems  Cardiovascular:  Cardiovascular Normal                                            Pulmonary:  Pulmonary Normal                       Hepatic/GI:    Hiatal Hernia, GERD   Gastric volvulus decompressed with NGT          Neurological:        Peripheral Neuropathy                          Endocrine:  Diabetes               Physical Exam  General: Well nourished    Airway:  Mallampati: II   Neck ROM: Normal ROM    Dental:  Intact    Chest/Lungs:  Clear to auscultation, Normal Respiratory Rate    Heart:  Rate: Normal  Rhythm: Regular Rhythm        Anesthesia Plan  Type of Anesthesia, risks & benefits discussed:    Anesthesia Type: Gen ETT  Intra-op Monitoring Plan: Standard ASA Monitors  Post Op Pain Control Plan: multimodal analgesia and IV/PO Opioids PRN  Induction:  IV and Inhalation  Informed Consent: Informed consent signed with the Patient and all parties understand the risks and agree with anesthesia plan.  All questions answered.   ASA Score: 2 Emergent    Ready For Surgery From Anesthesia Perspective.     .

## 2024-04-13 NOTE — PLAN OF CARE
Problem: Adult Inpatient Plan of Care  Goal: Plan of Care Review  Outcome: Ongoing, Progressing   Supine with HOB up 45. Tele 8714 intact. Saline loc in right ac with DDI. No redness or swelling at site. NS with 20kcl infusing at 125 cc/hr into right fa with out difficulty. No redness or swelling  at site. NG tube in left nare secured with tape and connected to LIWS. No drainage in canister. Valverde cath intact and draining yellow urine in drainage bag. Call light in reach . Will continue to monitor.   Problem: Adult Inpatient Plan of Care  Goal: Optimal Comfort and Wellbeing  Outcome: Ongoing, Progressing   Q 2 hourly rounds made through out shift and IV site, pain and position monitored.   Problem: Diabetes Comorbidity  Goal: Blood Glucose Level Within Targeted Range  Outcome: Ongoing, Progressing   Cbgs monitored through out shift per MD order.

## 2024-04-13 NOTE — PROGRESS NOTES
[facility]  Progress Note    Patient Name: Syd Butterfield Jr.  MRN: [id number]  Patient Class: IP- Inpatient   Admission Date: 4/11/2024  Length of Stay: 1 days  Attending Physician: Zain Marvin MD  Primary Care Provider: Connie Mendoza MD        Subjective:     Principal Problem:Intractable nausea and vomiting        HPI:  84-year-old male presented to ED for eval of intractable NV and abd pain. Patient reported he has had NV and epigastric pain for several weeks, with inability to tolerate PO. Reported this is his third ED visit in 2 weeks. On 4/1/24 CT abd/pelvis at [facility] with large paraesophageal hernia, constipation, nonobstructing right intrarenal calculus measuring approximately 6 mm. Patient reported he was instructed to follow up with GI for the hernia but [facility] and was told to go to the ED. Patient does also report many years ago he did have EGD with dil. Reported he has been taking zofran at home without relief. He also was started on Ozempic about 3 months ago and he says he is stopped taking it about 6 weeks ago or more. On my exam, patient experienced forceful retching with copious amounts of brown bile. He denies coffee ground emesis, denies hematochezia or melena. Reports intermittent soft and hard bowel movements. Patient noted to lave leukocytosis and elevated lactic acid level in ED. Patient does report chills at home. Given zosyn and IVF. Lipase unremarkable. Given previous imaging with constipation and intrarenal calculus, concern for infection, and severity of symptoms will repeat CT abd/pelvis. Admit to hospital medicine for further eval.     Overview/Hospital Course:  84-year-old male presented to ED for eval of intractable NV and abd pain. Patient reported he has had NV and epigastric pain for several weeks, with inability to tolerate PO. Reported this is his third ED visit in 2 weeks. On  4/1/24 CT abd/pelvis at [facility] with large paraesophageal hernia, constipation, nonobstructing right intrarenal calculus measuring approximately 6 mm. Patient reported he was instructed to follow up with GI for the hernia but he has been [facility] and was told to go to the ED. Patient does also report many years ago he did have EGD with dil. Reported he has been taking zofran at home without relief. He also was started on Ozempic about 3 months ago and he says he is stopped taking it about 6 weeks ago or more. On my exam, patient experienced forceful retching with copious amounts of brown bile. He denies coffee ground emesis, denies hematochezia or melena. Reports intermittent soft and hard bowel movements. Patient noted to lave leukocytosis and elevated lactic acid level in ED. Patient does report chills at home. Given zosyn and IVF. Lipase unremarkable. Given previous imaging with constipation and intrarenal calculus, concern for infection, and severity of symptoms will repeat CT abd/pelvis. Admit to hospital medicine for further eval.   General surgery evaluated patient and he is taking into procedure today.    Interval History:  Patient was seen and examined follow-up visit during multidisciplinary rounds.  Patient is fully oriented, feeling better, he is NPO and awaiting procedure later today.    Review of Systems   Constitutional:  Positive for activity change and appetite change. Negative for fever.   HENT:  Negative for ear discharge, facial swelling and sore throat.    Eyes:  Negative for photophobia, pain and visual disturbance.   Respiratory:  Negative for apnea, cough and shortness of breath.    Cardiovascular:  Negative for chest pain and leg swelling.   Gastrointestinal:  Positive for abdominal distention, nausea and vomiting. Negative for abdominal pain and blood in stool.   Endocrine: Negative for cold intolerance and heat intolerance.    Musculoskeletal:  Negative for back pain and gait problem.   Skin:  Negative for pallor and rash.   Neurological:  Negative for speech difficulty and headaches.   Psychiatric/Behavioral:  Negative for confusion, hallucinations and suicidal ideas.    All other systems reviewed and are negative.    Objective:     Vital Signs (Most Recent):  Temp: 98.8 °F (37.1 °C) (04/13/24 1550)  Pulse: 94 (04/13/24 1555)  Resp: (!) 26 (04/13/24 1555)  BP: 129/60 (04/13/24 1555)  SpO2: 98 % (04/13/24 1555) Vital Signs (24h Range):  Temp:  [97.8 °F (36.6 °C)-99.5 °F (37.5 °C)] 98.8 °F (37.1 °C)  Pulse:  [73-95] 94  Resp:  [15-26] 26  SpO2:  [94 %-98 %] 98 %  BP: (129-182)/(60-83) 129/60     Weight: 70 kg (154 lb 5.2 oz)  Body mass index is 22.79 kg/m².    Intake/Output Summary (Last 24 hours) at 4/13/2024 1601  Last data filed at 4/13/2024 1548  Gross per 24 hour   Intake 6071.49 ml   Output 1350 ml   Net 4721.49 ml         Physical Exam  Vitals and nursing note reviewed.   Constitutional:       General: He is not in acute distress.     Appearance: He is not diaphoretic.   HENT:      Head: Normocephalic.   Eyes:      General: No scleral icterus.        Right eye: No discharge.         Left eye: No discharge.      Conjunctiva/sclera: Conjunctivae normal.   Neck:      Vascular: No JVD.   Cardiovascular:      Rate and Rhythm: Normal rate and regular rhythm.      Heart sounds: Normal heart sounds. No murmur heard.     No friction rub.   Pulmonary:      Effort: Pulmonary effort is normal. No respiratory distress.      Breath sounds: Normal breath sounds. No wheezing.   Abdominal:      General: Bowel sounds are normal. There is distension.      Palpations: Abdomen is soft.      Tenderness: There is abdominal tenderness.   Musculoskeletal:         General: Normal range of motion.   Lymphadenopathy:      Cervical: No cervical adenopathy.   Skin:     Findings: No erythema or rash.   Neurological:      Mental Status: He is alert and oriented  "to person, place, and time.      Deep Tendon Reflexes: Reflexes are normal and symmetric.   Psychiatric:         Behavior: Behavior normal.             Significant Labs: All pertinent labs within the past 24 hours have been reviewed.  BMP:   Recent Labs   Lab 04/13/24  0348   *      K 3.5      CO2 27   BUN 21   CREATININE 1.1   CALCIUM 8.6*   MG 1.8     CBC:   Recent Labs   Lab 04/11/24  1645 04/12/24  0519 04/13/24  0348   WBC 17.30* 15.81* 17.81*   HGB 13.5* 12.1* 11.7*   HCT 40.2 36.7* 35.0*    293 267       Significant Imaging: I have reviewed all pertinent imaging results/findings within the past 24 hours.    Assessment/Plan:      * Intractable nausea and vomiting  Imaging study reviewed, patient had large hiatal hernia/paraesophagus hernia which likely cause gastric volvulus.  Need to rule out gastric outlet obstruction.  Continue NG tube with suction  GI and general surgery has been consulted  Continue PPI b.i.d.  NPO    Diabetes mellitus  Patient's FSGs are controlled on current medication regimen.  Last A1c reviewed- No results found for: "LABA1C", "HGBA1C"  Most recent fingerstick glucose reviewed- No results for input(s): "POCTGLUCOSE" in the last 24 hours.  Current correctional scale  Low  Maintain anti-hyperglycemic dose as follows-   Antihyperglycemics (From admission, onward)      Start     Stop Route Frequency Ordered    04/12/24 0600  insulin aspart U-100 pen 0-5 Units         -- SubQ Every 6 hours 04/12/24 0055          Hold Oral hypoglycemics while patient is in the hospital.    Open wound of right hand  Blood cx's  Procalcitonin  R hand XR given open wound, leukocytosis, and DM    Leukocytosis  Very unlikely infectious  Empiric IV Zosyn now  IV hydration        VTE Risk Mitigation (From admission, onward)           Ordered     Reason for No Pharmacological VTE Prophylaxis  Once        Question:  Reasons:  Answer:  Risk of Bleeding    04/12/24 0055     IP VTE HIGH RISK " PATIENT  Once         04/12/24 0055     Place sequential compression device  Until discontinued         04/12/24 0055                    Discharge Planning   SHAKIR: 4/16/2024     Code Status: Full Code   Is the patient medically ready for discharge?:     Reason for patient still in hospital (select all that apply): Patient trending condition  Discharge Plan A: Home                  Zain Marvin MD  Department of Hospital Medicine   [facility]/Surg

## 2024-04-13 NOTE — PLAN OF CARE
Released per anesthesia, when criteria met. VS WDL, Resp even and unlabored. Pt awake and alert.Abdominal sites x 6 dry and intact with dermambond, denies pain and nausea, stauffer in place draining clear yellow urine.

## 2024-04-13 NOTE — SUBJECTIVE & OBJECTIVE
Interval History:  Patient was seen and examined follow-up visit during multidisciplinary rounds.  Patient is fully oriented, feeling better, he is NPO and awaiting procedure later today.    Review of Systems   Constitutional:  Positive for activity change and appetite change. Negative for fever.   HENT:  Negative for ear discharge, facial swelling and sore throat.    Eyes:  Negative for photophobia, pain and visual disturbance.   Respiratory:  Negative for apnea, cough and shortness of breath.    Cardiovascular:  Negative for chest pain and leg swelling.   Gastrointestinal:  Positive for abdominal distention, nausea and vomiting. Negative for abdominal pain and blood in stool.   Endocrine: Negative for cold intolerance and heat intolerance.   Musculoskeletal:  Negative for back pain and gait problem.   Skin:  Negative for pallor and rash.   Neurological:  Negative for speech difficulty and headaches.   Psychiatric/Behavioral:  Negative for confusion, hallucinations and suicidal ideas.    All other systems reviewed and are negative.    Objective:     Vital Signs (Most Recent):  Temp: 98.8 °F (37.1 °C) (04/13/24 1550)  Pulse: 94 (04/13/24 1555)  Resp: (!) 26 (04/13/24 1555)  BP: 129/60 (04/13/24 1555)  SpO2: 98 % (04/13/24 1555) Vital Signs (24h Range):  Temp:  [97.8 °F (36.6 °C)-99.5 °F (37.5 °C)] 98.8 °F (37.1 °C)  Pulse:  [73-95] 94  Resp:  [15-26] 26  SpO2:  [94 %-98 %] 98 %  BP: (129-182)/(60-83) 129/60     Weight: 70 kg (154 lb 5.2 oz)  Body mass index is 22.79 kg/m².    Intake/Output Summary (Last 24 hours) at 4/13/2024 1601  Last data filed at 4/13/2024 1548  Gross per 24 hour   Intake 6071.49 ml   Output 1350 ml   Net 4721.49 ml         Physical Exam  Vitals and nursing note reviewed.   Constitutional:       General: He is not in acute distress.     Appearance: He is not diaphoretic.   HENT:      Head: Normocephalic.   Eyes:      General: No scleral icterus.        Right eye: No discharge.         Left eye:  No discharge.      Conjunctiva/sclera: Conjunctivae normal.   Neck:      Vascular: No JVD.   Cardiovascular:      Rate and Rhythm: Normal rate and regular rhythm.      Heart sounds: Normal heart sounds. No murmur heard.     No friction rub.   Pulmonary:      Effort: Pulmonary effort is normal. No respiratory distress.      Breath sounds: Normal breath sounds. No wheezing.   Abdominal:      General: Bowel sounds are normal. There is distension.      Palpations: Abdomen is soft.      Tenderness: There is abdominal tenderness.   Musculoskeletal:         General: Normal range of motion.   Lymphadenopathy:      Cervical: No cervical adenopathy.   Skin:     Findings: No erythema or rash.   Neurological:      Mental Status: He is alert and oriented to person, place, and time.      Deep Tendon Reflexes: Reflexes are normal and symmetric.   Psychiatric:         Behavior: Behavior normal.             Significant Labs: All pertinent labs within the past 24 hours have been reviewed.  BMP:   Recent Labs   Lab 04/13/24  0348   *      K 3.5      CO2 27   BUN 21   CREATININE 1.1   CALCIUM 8.6*   MG 1.8     CBC:   Recent Labs   Lab 04/11/24  1645 04/12/24  0519 04/13/24  0348   WBC 17.30* 15.81* 17.81*   HGB 13.5* 12.1* 11.7*   HCT 40.2 36.7* 35.0*    293 267       Significant Imaging: I have reviewed all pertinent imaging results/findings within the past 24 hours.

## 2024-04-13 NOTE — HOSPITAL COURSE
84-year-old male presented to ED for eval of intractable NV and abd pain. Patient reported he has had NV and epigastric pain for several weeks, with inability to tolerate PO. Reported this is his third ED visit in 2 weeks. On 4/1/24 CT abd/pelvis at [place] [facility] with large paraesophageal hernia, constipation, nonobstructing right intrarenal calculus measuring approximately 6 mm. Patient reported he was instructed to follow up with GI for the hernia but [facility] and was told to go to the ED. Patient does also report many years ago he did have EGD with dil. Reported he has been taking zofran at home without relief. He also was started on Ozempic about 3 months ago and he says he is stopped taking it about 6 weeks ago or more. On my exam, patient experienced forceful retching with copious amounts of brown bile. He denies coffee ground emesis, denies hematochezia or melena. Reports intermittent soft and hard bowel movements. Patient noted to lave leukocytosis and elevated lactic acid level in ED. Patient does report chills at home. Given zosyn and IVF. Lipase unremarkable. Given previous imaging with constipation and intrarenal calculus, concern for infection, and severity of symptoms will repeat CT abd/pelvis. Admit to hospital medicine for further eval.   General surgery evaluated patient and he went into surgery.  Patient tolerated procedure well, he was started on clear liquids same night.  Next day he was advised on diet, tolerated well, denied nausea, was re-evaluated by surgeon and cleared for discharge home with outpatient follow-up as well with GI Medicine to investigate further imaging findings on his pancreas.

## 2024-04-13 NOTE — OP NOTE
[facility]/Surg  Surgery Department  Operative Note    SUMMARY     Date of Procedure: 4/13/2024     Procedure: Procedure(s) (LRB):  XI ROBOTIC REPAIR, HERNIA, HIATAL, WITH FUNDOPLICATION (N/A)  XI ROBOTIC GASTROPEXY (N/A)   22 modifier    Surgeons and Role:     * Nicolás Pryor MD - Primary    Assisting Surgeon: Hortencia DAHL    Pre-Operative Diagnosis: Hiatal hernia [K44.9]    Post-Operative Diagnosis: Post-Op Diagnosis Codes:     * Hiatal hernia [K44.9]    Anesthesia: General    Operative Findings (including complications, if any):  Giant hiatal hernia with incarcerated and volvulized stomach.  Reduction and crural closure.  The stomach appeared viable.  Ulises fundoplication.  Gastropexy    Description of Technical Procedures:  This is an 84-year-old male who presented with a gastric obstruction related to a volvulized hiatal hernia.  Patient also had a pancreatic mass.  Given his inability to tolerate anything p.o., recommended operative exploration with hiatal hernia repair, possible fundoplication, possible gastropexy not only to fix the hiatal hernia but to also potentially facilitate endoscopic biopsy of his pancreatic mass.  Patient did consent to the planned procedure.  He was taken to the OR, placed supine, general endotracheal anesthesia was induced, the abdomen was prepped and draped in the usual fashion, a time-out was completed.  Access to the abdomen was achieved using Optiview technique in the left paramedian position.  The abdomen was insufflated to 15 mmHg a scope was inserted.  There was no apparent injury during entry.  Four 8 mm robotic trocars were placed superior to our entry trocar under direct visualization as well as a subxiphoid Charles liver retractor.  The robot was then docked.  I proceeded to identify a giant hiatal hernia with the stomach incarcerated within the chest cavity.  Prior to attempting hiatal hernia repair, the abdomen was inspected robotically noting  no peritoneal implants that were clearly visible.  There was a clear spot in the gastrocolic ligament that was opened using a vessel seal device.  The lesser sac was inspected.  Again there was no obvious abnormality.  To the hiatus.  The pars flaccida was incised with a vessel seal device.  There was a large replaced left hepatic artery along the right anthony of the diaphragm which was spared.  The hernia sac was incised along the right anthony and a combination of blunt and electrocautery dissection was used to circumferentially free the hernia sac from the remaining crural attachments.  Blunt dissection was well as LigaSure dissection was used to free the hernia sac from the chest cavity and reduce the incarcerated stomach.  The stomach appeared viable without ischemic changes.  With both the left and right crura exposed as well as the stomach being reduced, we now had appropriate borders for planned hiatal hernia repair.  The crura were closed using a permanent 0 V lock suture so that the esophagus filled the remaining hiatus.  Given his lack of preoperative motility imaging and lack of reflux symptoms, I elected to perform a Ulises fundoplication.  The short gastrics were divided using the vessel seal device along the greater curvature of the stomach.  The stomach was wrapped anteriorly and the fundoplication was fashioned using 0 Ethibond sutures.  The superior aspect of the fundoplication was also secured to the hiatus., insufflation was allowed to escape to 4 mmHg.  A gastropexy was performed to help anchor the stomach within the abdominal cavity again using 0 Ethibond sutures along the anterior surface of the stomach.  It should be noted that the hernia sac during the case was excised.  This was removed from the left paramedian robotic trocar with an Endo-Catch bag and sent as a specimen.  The abdominal cavity was inspected for hemostasis which was adequate.  Trocars were removed and insufflation was allowed to  escape.  Skin incisions were closed with 4-0 Monocryl.  Dermabond was applied as a dressing.  Patient tolerated the entire procedure without apparent complication, was extubated in the OR, brought to recovery in stable condition.  All counts were correct.  A 22 modifier is justified given the difficulty with dissection related to a large defect size, chronic incarceration, and a replaced left hepatic artery.    Significant Surgical Tasks Conducted by the Assistant(s), if Applicable:  Patient positioning and prep, bedside assist, skin closure, bandage application.    Estimated Blood Loss (EBL):  Minimal  Implants: * No implants in log *    Specimens:   Specimen (24h ago, onward)       Start     Ordered    04/13/24 1527  Specimen to Pathology - Surgery  Once        Comments: Pre-op Diagnosis: Hiatal hernia [K44.9]Procedure(s):ROBOTIC REPAIR, HERNIA, HIATALXI ROBOTIC REPAIR, HERNIA, HIATAL, WITH FUNDOPLICATIONXI ROBOTIC GASTROPEXY Number of specimens: 1Name of specimens: HERNIA SAC     Question:  Release to patient  Answer:  Immediate    04/13/24 1533                            Condition: Good    Disposition: PACU - hemodynamically stable.    Attestation: Op Note Attestation: I was physically present and scrubbed for the entire procedure.

## 2024-04-13 NOTE — PLAN OF CARE
Nutrition Recommendations 4/13/24:  1. Initiate pt onto continuous Enteral nutrition, recommemend Diabetisource AC via NG tube, goal rate 65 mL, starting at 10 mL/hr, then advance to goal within 24 hrs if pt tolerating or per MD/NP   -Formula at goal rate provides: 1872 kcals/day  (100% EEN), 94 g protein/day (109% EPN), 1273 mL free formula water/day (68% fluid needs)   -100 mL q4h free water flushes (600 mL/day) = total from formula + FWF = 1873 mL water/day (100% fluid needs), per MD/NP   -Check Mg, K+, Na, Phos and Glu before and during initiation, correct as indicated   2. Recommend antinausea/ antiemetic medication as warranted   3. Weigh twice weekly  4. Collaboration with other providers    Goals:   1. Pt will be initiated onto EN within 24 hrs   2. Pt will toelrate and intake > 75% EEN and EPN prior to RD follow  3. Pt's N/V will improve by RD follow up     Carol Grimaldo, Registration Eligible, Provisional LDN

## 2024-04-13 NOTE — NURSING
Nurses Note -- 4 Eyes      4/13/2024   12:26 AM      Skin assessed during: Admit       No [x]Altered Skin Integrity Present    [x]Prevention Measures Documented      [] Yes- Altered Skin Integrity Present or Discovered   [] LDA Added if Not in Epic (Describe Wound)   [] New Altered Skin Integrity was Present on Admit and Documented in LDA   [] Wound Image Taken    Wound Care Consulted? No    Attending Nurse:  Darryleemay Second RN/Staff Member:  ZACHARIAH Prieto

## 2024-04-13 NOTE — TRANSFER OF CARE
" Anesthesia Transfer of Care Note    Patient: Syd Butterfield Jr.    Procedure(s) Performed: Procedure(s) (LRB):  XI ROBOTIC REPAIR, HERNIA, HIATAL, WITH FUNDOPLICATION (N/A)  XI ROBOTIC GASTROPEXY (N/A)    Anesthesia Type: general    Transport from OR: Transported from OR on 2-3 L/min O2 by NC with adequate spontaneous ventilation    Post pain: adequate analgesia    Post assessment: no apparent anesthetic complications    Level of consciousness: sedated    Nausea/Vomiting: no nausea/vomiting    Complications: none    Transfer of care protocol was followed      Last vitals: Visit Vitals  /60   Pulse 94   Temp 37.1 °C (98.8 °F) (Temporal)   Resp (!) 26   Ht 5' 9" (1.753 m)   Wt 70 kg (154 lb 5.2 oz)   SpO2 98%   BMI 22.79 kg/m²     "

## 2024-04-14 ENCOUNTER — TELEPHONE (OUTPATIENT)
Dept: GASTROENTEROLOGY | Facility: CLINIC | Age: 84
End: 2024-04-14
Payer: OTHER GOVERNMENT

## 2024-04-14 VITALS
TEMPERATURE: 98 F | WEIGHT: 154.31 LBS | RESPIRATION RATE: 18 BRPM | SYSTOLIC BLOOD PRESSURE: 179 MMHG | OXYGEN SATURATION: 98 % | DIASTOLIC BLOOD PRESSURE: 75 MMHG | BODY MASS INDEX: 22.85 KG/M2 | HEIGHT: 69 IN | HEART RATE: 75 BPM

## 2024-04-14 DIAGNOSIS — K86.9 PANCREATIC LESION: Primary | ICD-10-CM

## 2024-04-14 PROBLEM — R11.2 INTRACTABLE NAUSEA AND VOMITING: Status: RESOLVED | Noted: 2024-04-12 | Resolved: 2024-04-14

## 2024-04-14 PROBLEM — S61.401A OPEN WOUND OF RIGHT HAND: Status: RESOLVED | Noted: 2024-04-12 | Resolved: 2024-04-14

## 2024-04-14 LAB
ALBUMIN SERPL BCP-MCNC: 2.9 G/DL (ref 3.5–5.2)
ALP SERPL-CCNC: 70 U/L (ref 55–135)
ALT SERPL W/O P-5'-P-CCNC: 108 U/L (ref 10–44)
ANION GAP SERPL CALC-SCNC: 12 MMOL/L (ref 8–16)
AST SERPL-CCNC: 86 U/L (ref 10–40)
BASOPHILS # BLD AUTO: 0.03 K/UL (ref 0–0.2)
BASOPHILS NFR BLD: 0.2 % (ref 0–1.9)
BILIRUB SERPL-MCNC: 1.8 MG/DL (ref 0.1–1)
BUN SERPL-MCNC: 24 MG/DL (ref 8–23)
CALCIUM SERPL-MCNC: 8.7 MG/DL (ref 8.7–10.5)
CHLORIDE SERPL-SCNC: 106 MMOL/L (ref 95–110)
CO2 SERPL-SCNC: 22 MMOL/L (ref 23–29)
CREAT SERPL-MCNC: 1.1 MG/DL (ref 0.5–1.4)
DIFFERENTIAL METHOD BLD: ABNORMAL
EOSINOPHIL # BLD AUTO: 0 K/UL (ref 0–0.5)
EOSINOPHIL NFR BLD: 0 % (ref 0–8)
ERYTHROCYTE [DISTWIDTH] IN BLOOD BY AUTOMATED COUNT: 13.5 % (ref 11.5–14.5)
EST. GFR  (NO RACE VARIABLE): >60 ML/MIN/1.73 M^2
GLUCOSE SERPL-MCNC: 280 MG/DL (ref 70–110)
HCT VFR BLD AUTO: 32.7 % (ref 40–54)
HGB BLD-MCNC: 10.6 G/DL (ref 14–18)
IMM GRANULOCYTES # BLD AUTO: 0.07 K/UL (ref 0–0.04)
IMM GRANULOCYTES NFR BLD AUTO: 0.4 % (ref 0–0.5)
LYMPHOCYTES # BLD AUTO: 1.1 K/UL (ref 1–4.8)
LYMPHOCYTES NFR BLD: 6.3 % (ref 18–48)
MAGNESIUM SERPL-MCNC: 1.8 MG/DL (ref 1.6–2.6)
MCH RBC QN AUTO: 30.1 PG (ref 27–31)
MCHC RBC AUTO-ENTMCNC: 32.4 G/DL (ref 32–36)
MCV RBC AUTO: 93 FL (ref 82–98)
MONOCYTES # BLD AUTO: 0.5 K/UL (ref 0.3–1)
MONOCYTES NFR BLD: 3.1 % (ref 4–15)
NEUTROPHILS # BLD AUTO: 15.5 K/UL (ref 1.8–7.7)
NEUTROPHILS NFR BLD: 90 % (ref 38–73)
NRBC BLD-RTO: 0 /100 WBC
PLATELET # BLD AUTO: 229 K/UL (ref 150–450)
PMV BLD AUTO: 10.7 FL (ref 9.2–12.9)
POCT GLUCOSE: 266 MG/DL (ref 70–110)
POCT GLUCOSE: 278 MG/DL (ref 70–110)
POCT GLUCOSE: 319 MG/DL (ref 70–110)
POTASSIUM SERPL-SCNC: 3.7 MMOL/L (ref 3.5–5.1)
PROT SERPL-MCNC: 5.6 G/DL (ref 6–8.4)
RBC # BLD AUTO: 3.52 M/UL (ref 4.6–6.2)
SODIUM SERPL-SCNC: 140 MMOL/L (ref 136–145)
WBC # BLD AUTO: 17.18 K/UL (ref 3.9–12.7)

## 2024-04-14 PROCEDURE — 63600175 PHARM REV CODE 636 W HCPCS: Performed by: STUDENT IN AN ORGANIZED HEALTH CARE EDUCATION/TRAINING PROGRAM

## 2024-04-14 PROCEDURE — 94799 UNLISTED PULMONARY SVC/PX: CPT | Mod: XB

## 2024-04-14 PROCEDURE — 99232 SBSQ HOSP IP/OBS MODERATE 35: CPT | Mod: ,,, | Performed by: INTERNAL MEDICINE

## 2024-04-14 PROCEDURE — 25000003 PHARM REV CODE 250: Performed by: INTERNAL MEDICINE

## 2024-04-14 PROCEDURE — 51798 US URINE CAPACITY MEASURE: CPT

## 2024-04-14 PROCEDURE — 85025 COMPLETE CBC W/AUTO DIFF WBC: CPT | Performed by: STUDENT IN AN ORGANIZED HEALTH CARE EDUCATION/TRAINING PROGRAM

## 2024-04-14 PROCEDURE — 51702 INSERT TEMP BLADDER CATH: CPT

## 2024-04-14 PROCEDURE — C9113 INJ PANTOPRAZOLE SODIUM, VIA: HCPCS | Performed by: STUDENT IN AN ORGANIZED HEALTH CARE EDUCATION/TRAINING PROGRAM

## 2024-04-14 PROCEDURE — 80053 COMPREHEN METABOLIC PANEL: CPT | Performed by: STUDENT IN AN ORGANIZED HEALTH CARE EDUCATION/TRAINING PROGRAM

## 2024-04-14 PROCEDURE — 36415 COLL VENOUS BLD VENIPUNCTURE: CPT | Performed by: STUDENT IN AN ORGANIZED HEALTH CARE EDUCATION/TRAINING PROGRAM

## 2024-04-14 PROCEDURE — 94761 N-INVAS EAR/PLS OXIMETRY MLT: CPT

## 2024-04-14 PROCEDURE — 83735 ASSAY OF MAGNESIUM: CPT | Performed by: STUDENT IN AN ORGANIZED HEALTH CARE EDUCATION/TRAINING PROGRAM

## 2024-04-14 PROCEDURE — 25000003 PHARM REV CODE 250: Performed by: STUDENT IN AN ORGANIZED HEALTH CARE EDUCATION/TRAINING PROGRAM

## 2024-04-14 RX ORDER — PROMETHAZINE HYDROCHLORIDE 12.5 MG/1
12.5 SUPPOSITORY RECTAL EVERY 6 HOURS PRN
Qty: 10 SUPPOSITORY | Refills: 0 | Status: SHIPPED | OUTPATIENT
Start: 2024-04-14 | End: 2024-04-17

## 2024-04-14 RX ORDER — ONDANSETRON 4 MG/1
4 TABLET, ORALLY DISINTEGRATING ORAL EVERY 8 HOURS PRN
Qty: 10 TABLET | Refills: 0 | Status: SHIPPED | OUTPATIENT
Start: 2024-04-14 | End: 2024-04-17

## 2024-04-14 RX ADMIN — INSULIN ASPART 1 UNITS: 100 INJECTION, SOLUTION INTRAVENOUS; SUBCUTANEOUS at 12:04

## 2024-04-14 RX ADMIN — SODIUM CHLORIDE AND POTASSIUM CHLORIDE: .9; .15 SOLUTION INTRAVENOUS at 12:04

## 2024-04-14 RX ADMIN — PIPERACILLIN AND TAZOBACTAM 3.38 G: 3; .375 INJECTION, POWDER, LYOPHILIZED, FOR SOLUTION INTRAVENOUS; PARENTERAL at 10:04

## 2024-04-14 RX ADMIN — INSULIN ASPART 4 UNITS: 100 INJECTION, SOLUTION INTRAVENOUS; SUBCUTANEOUS at 12:04

## 2024-04-14 RX ADMIN — PIPERACILLIN AND TAZOBACTAM 3.38 G: 3; .375 INJECTION, POWDER, LYOPHILIZED, FOR SOLUTION INTRAVENOUS; PARENTERAL at 03:04

## 2024-04-14 RX ADMIN — PANTOPRAZOLE SODIUM 40 MG: 40 INJECTION, POWDER, LYOPHILIZED, FOR SOLUTION INTRAVENOUS at 09:04

## 2024-04-14 RX ADMIN — MUPIROCIN 1 G: 20 OINTMENT TOPICAL at 09:04

## 2024-04-14 RX ADMIN — INSULIN DETEMIR 10 UNITS: 100 INJECTION, SOLUTION SUBCUTANEOUS at 09:04

## 2024-04-14 NOTE — PLAN OF CARE
[facility] - Med/Surg  Discharge Final Note    Primary Care Provider: Connie Mendoza MD    Expected Discharge Date: 4/14/2024    SW reviewed discharge orders. Patient discharged home this afternoon with no case management needs and family provided transportation. No further case management needs. Cleared.   Final Discharge Note (most recent)       Final Note - 04/14/24 1815          Final Note    Assessment Type Final Discharge Note     Anticipated Discharge Disposition Home or Self Care     What phone number can be called within the next 1-3 days to see how you are doing after discharge? [phone]     Hospital Resources/Appts/Education Provided Provided patient/caregiver with written discharge plan information        Post-Acute Status    Coverage [facility]     Discharge Delays None known at this time                     Important Message from Medicare             Contact Info       Nicolás Pryor MD   Specialty: General Surgery    [address]  [address]  [place] [address]   Phone: [phone]       Next Steps: Follow up    Connie Mendoza MD   Specialty: Family Medicine   Relationship: PCP - General    [address]   [address]  [place] [address]   Phone: [phone]       Next Steps: Follow up

## 2024-04-14 NOTE — DISCHARGE SUMMARY
[facility] Medicine  Discharge Summary      Patient Name: Syd Butterfield Jr.  MRN: [id number]  FERMIN: [id number]  Patient Class: IP- Inpatient  Admission Date: 4/11/2024  Hospital Length of Stay: 2 days  Discharge Date and Time:  04/14/2024 2:30 PM  Attending Physician: Zain Marvin MD   Discharging Provider: Zain Marvin MD  Primary Care Provider: Connie Mendoza MD    Primary Care Team: Networked reference to record PCT     HPI:   84-year-old male presented to ED for eval of intractable NV and abd pain. Patient reported he has had NV and epigastric pain for several weeks, with inability to tolerate PO. Reported this is his third ED visit in 2 weeks. On 4/1/24 CT abd/pelvis at [place] [facility] with large paraesophageal hernia, constipation, nonobstructing right intrarenal calculus measuring approximately 6 mm. Patient reported he was instructed to follow up with GI for the hernia but [facility] and was told to go to the ED. Patient does also report many years ago he did have EGD with dil. Reported he has been taking zofran at home without relief. He also was started on Ozempic about 3 months ago and he says he is stopped taking it about 6 weeks ago or more. On my exam, patient experienced forceful retching with copious amounts of brown bile. He denies coffee ground emesis, denies hematochezia or melena. Reports intermittent soft and hard bowel movements. Patient noted to lave leukocytosis and elevated lactic acid level in ED. Patient does report chills at home. Given zosyn and IVF. Lipase unremarkable. Given previous imaging with constipation and intrarenal calculus, concern for infection, and severity of symptoms will repeat CT abd/pelvis. Admit to hospital medicine for further eval.     Procedure(s) (LRB):  XI ROBOTIC REPAIR, HERNIA, HIATAL, WITH FUNDOPLICATION (N/A)  XI ROBOTIC GASTROPEXY (N/A)      Hospital Course:   84-year-old  male presented to ED for eval of intractable NV and abd pain. Patient reported he has had NV and epigastric pain for several weeks, with inability to tolerate PO. Reported this is his third ED visit in 2 weeks. On 4/1/24 CT abd/pelvis at [place] [facility] with large paraesophageal hernia, constipation, nonobstructing right intrarenal calculus measuring approximately 6 mm. Patient reported he was instructed to follow up with GI for the hernia but [facility] and was told to go to the ED. Patient does also report many years ago he did have EGD with dil. Reported he has been taking zofran at home without relief. He also was started on Ozempic about 3 months ago and he says he is stopped taking it about 6 weeks ago or more. On my exam, patient experienced forceful retching with copious amounts of brown bile. He denies coffee ground emesis, denies hematochezia or melena. Reports intermittent soft and hard bowel movements. Patient noted to lave leukocytosis and elevated lactic acid level in ED. Patient does report chills at home. Given zosyn and IVF. Lipase unremarkable. Given previous imaging with constipation and intrarenal calculus, concern for infection, and severity of symptoms will repeat CT abd/pelvis. Admit to hospital medicine for further eval.   General surgery evaluated patient and he went into surgery.  Patient tolerated procedure well, he was started on clear liquids same night.  Next day he was advised on diet, tolerated well, denied nausea, was re-evaluated by surgeon and cleared for discharge home with outpatient follow-up as well with GI Medicine to investigate further imaging findings on his pancreas.     Goals of Care Treatment Preferences:  Code Status: Full Code      Consults:   Consults (From admission, onward)          Status Ordering Provider     Inpatient consult to Registered Dietitian/Nutritionist  Once        Provider:  (Not yet assigned)     Completed CAM MARTIN     Inpatient consult to General Surgery  Once        Provider:  Niels Cotter III, MD    Completed TONJA ASHLEY     Inpatient consult to Gastroenterology  Once        Provider:  Cuco Contreras MD    Completed TONJA ASHLEY            No new Assessment & Plan notes have been filed under this hospital service since the last note was generated.  Service: Hospital Medicine    Final Active Diagnoses:    Diagnosis Date Noted POA    Leukocytosis [D72.829] 04/12/2024 Yes    Diabetes mellitus [E11.9] 04/12/2024 Yes      Problems Resolved During this Admission:    Diagnosis Date Noted Date Resolved POA    PRINCIPAL PROBLEM:  Intractable nausea and vomiting [R11.2] 04/12/2024 04/14/2024 Yes    Open wound of right hand [S61.401A] 04/12/2024 04/14/2024 Yes       Discharged Condition: fair    Disposition: Home or Self Care    Follow Up:    Patient Instructions:   No discharge procedures on file.    Significant Diagnostic Studies: Labs: BMP:   Recent Labs   Lab 04/13/24 0348 04/14/24  0314   * 280*    140   K 3.5 3.7    106   CO2 27 22*   BUN 21 24*   CREATININE 1.1 1.1   CALCIUM 8.6* 8.7   MG 1.8 1.8    and CMP   Recent Labs   Lab 04/13/24 0348 04/14/24  0314    140   K 3.5 3.7    106   CO2 27 22*   * 280*   BUN 21 24*   CREATININE 1.1 1.1   CALCIUM 8.6* 8.7   PROT 5.9* 5.6*   ALBUMIN 2.9* 2.9*   BILITOT 2.0* 1.8*   ALKPHOS 79 70   AST 9* 86*   ALT 16 108*   ANIONGAP 12 12       Pending Diagnostic Studies:       Procedure Component Value Units Date/Time    Specimen to Pathology - Surgery [[id number]] Collected: 04/13/24 1533    Order Status: Sent Lab Status: No result     Specimen: Tissue            Medications:  Reconciled Home Medications:      Medication List        START taking these medications      ondansetron 4 MG Tbdl  Commonly known as: ZOFRAN-ODT  Take 1 tablet (4 mg total) by mouth every 8 (eight) hours as needed (nausea).      promethazine 12.5 MG Supp  Commonly known as: PHENERGAN  Place 1 suppository (12.5 mg total) rectally every 6 (six) hours as needed (nausea).            CONTINUE taking these medications      blood sugar diagnostic Strp  USE 1 STRIP FOR TESTING SUBCUTANEOUSLY 6 TIMES EVERY DAY TO CHECK BLOOD SUGARS     calcipotriene 0.005 % cream  Commonly known as: DOVONOX  Apply 1 application  topically 2 (two) times daily.     empagliflozin 25 mg tablet  Commonly known as: Jardiance  Take 1 tablet by mouth every morning.     fluorouraciL 5 % cream  Commonly known as: EFUDEX  Apply 1 g topically 2 (two) times daily.     hydroCHLOROthiazide 25 MG tablet  Commonly known as: HYDRODIURIL  Take 25 mg by mouth once daily.     insulin aspart U-100 100 unit/mL injection  Commonly known as: NovoLOG  INJECT 8 UNTIS SUBCUTANEOUSLY EVERY MORNING FOR 30 DAYS, THEN INJECT 10 UNITS NOON FOR 30 DAYS, THEN INJECT 10 UNITS EVERY EVENING FOR 30 DAYS FOR DIABETES     insulin glargine 100 units/mL SubQ pen  INJECT 18 UNTIS SUBCUTANEOUSLY AT BEDTIME FOR DIABETES     lipase-protease-amylase 12,000-38,000-60,000 units Cpdr  Commonly known as: CREON  Take 1 capsule by mouth every morning.              Indwelling Lines/Drains at time of discharge:   Lines/Drains/Airways       Drain  Duration                  Urethral Catheter 04/12/24 0320 16 Fr. 2 days                    Time spent on the discharge of patient: 35 minutes         Zain Marvin MD  Department of Hospital Medicine  [facility]/Surg

## 2024-04-14 NOTE — PLAN OF CARE
Plan of care reviewed with pt. Pt verbalized understanding. Antibiotics administered according to schedule. Pt afebrile throughout shift. Safety precautions maintained.

## 2024-04-14 NOTE — PROGRESS NOTES
Patient seen and examined.  Doing well after hiatal hernia repair and Ulises fundoplication.  Tolerating liquids.      [place] was removed this morning.  If patient is able to void, okay for DC.  Up with me in 1-2 weeks.

## 2024-04-14 NOTE — CARE UPDATE
04/13/24 1934   Patient Assessment/Suction   Level of Consciousness (AVPU) alert   Respiratory Effort Unlabored   Expansion/Accessory Muscles/Retractions no use of accessory muscles;no retractions   PRE-TX-O2   Device (Oxygen Therapy) room air   SpO2 96 %   Pulse Oximetry Type Intermittent   $ Pulse Oximetry - Multiple Charge Pulse Oximetry - Multiple   Pulse 88   Resp 18   Incentive Spirometer   $ Incentive Spirometer Charges done with encouragement;proper technique demonstrated   Administration (IS) proper technique demonstrated   Number of Repetitions (IS) 8   Level Incentive Spirometer (mL) 2000   Patient Tolerance (IS) good;no adverse signs/symptoms present

## 2024-04-14 NOTE — PLAN OF CARE
POC reviewed verbalizes understanding at this time VSS afebrile denies pain denies nausea this shift Tolerated clear and full liquid diet this shift, without complications dc instruction explained verbalizes understanding dc to home

## 2024-04-14 NOTE — PROGRESS NOTES
[facility] Note    CC: Abdominal pain    HPI 84 y.o. male presented to ED for eval of intractable NV and abd pain. Patient reported he has had NV and epigastric pain for several weeks, with inability to tolerate PO. Reported this is his third ED visit in 2 weeks. On 4/1/24 CT abd/pelvis at [place] [facility] impression with large paraesophageal hernia, constipation, nonobstructing right intrarenal calculus measuring approximately 6 mm. Patient reported he was instructed to follow up with GI for the hernia but [facility] and was told to go to the ED. Patient does also report many years ago he did have EGD with dil. Reported he has been taking zofran at home without relief. He also was started on Ozempic about 3 months ago and he says he is stopped taking it about 6 weeks ago or more. On my exam, patient experienced forceful retching with copious amounts of brown bile. He denies coffee ground emesis, denies hematochezia or melena. Reports intermittent soft and hard bowel movements. Patient noted to lave leukocytosis and elevated lactic acid level in ED. Patient does report chills at home. Given zosyn and IVF. Lipase unremarkable. Given previous imaging with constipation and intrarenal calculus, concern for infection, and severity of symptoms will repeat CT abd/pelvis. Admit to hospital medicine for further eval.      Overview/Hospital Course:  No notes on file     Interval History:   84-year-old male with history of diabetes hypertension, hiatal hernia presenting here for recurrent nausea vomiting abdominal pain.  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  Patient has NG tube placement has significant output.      Seen in the multidisciplinary rounds, patient is still on NG tube with intermittent suction, has large amount greenish fluid come out, no fever or chills, GI and general surgery has been consulted and pending evaluation.     FURTHER  "HISTORY:  Above obtained from independent review of records from admitting provider as well as from direct discussion with Dr. Pryor who plans on surgery today.  In addition, on my interview, I note the following:  Patient presented with abdominal pain, recent onset, associated with intractable N/V, now relieved with NGT placement.  CT reviewed and showed evidence of large HH with gastric volvulus.  No history of EGD.  States that he had a colonoscopy about 4 years ago.    INTERVAL HISTORY:  Feeling better since surgery.  Tolerating clears.  Pain well controlled.  Discussed CT findings with patient and family at bedside.    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/2012    Mixed hyperlipidemia 04/16/2018    Sensorineural hearing loss (SNHL) of both ears 07/26/2018    Type 2 diabetes mellitus 04/07/2016    Vitamin B12 deficiency (non anemic) 09/28/2015    Vitamin D deficiency 04/16/2018         Review of Systems  General ROS: negative for - chills, fever or weight loss  Cardiovascular ROS: no chest pain or dyspnea on exertion  Gastrointestinal ROS: as above    Physical Examination  BP (!) 179/75 (BP Location: Left arm, Patient Position: Sitting)   Pulse 79   Temp 97.8 °F (36.6 °C) (Oral)   Resp 17   Ht 5' 9" (1.753 m)   Wt 70 kg (154 lb 5.2 oz)   SpO2 100%   BMI 22.79 kg/m²   General appearance: alert, cooperative, no distress  HENT: Normocephalic, atraumatic, neck symmetrical, no nasal discharge, sclera anicteric   Lungs: clear to auscultation bilaterally, symmetric chest wall expansion bilaterally  Heart: regular rate and rhythm without rub; no displacement of the PMI   Abdomen: soft, NT  Extremities: extremities symmetric; no clubbing, cyanosis, or " edema  Neurologic: Alert and oriented X 3, no sensory or motor neurologic deficits      Labs:  Lab Results   Component Value Date    WBC 17.18 (H) 04/14/2024    HGB 10.6 (L) 04/14/2024    HCT 32.7 (L) 04/14/2024    MCV 93 04/14/2024     04/14/2024         CMP  Sodium   Date Value Ref Range Status   04/14/2024 140 136 - 145 mmol/L Final     Potassium   Date Value Ref Range Status   04/14/2024 3.7 3.5 - 5.1 mmol/L Final     Chloride   Date Value Ref Range Status   04/14/2024 106 95 - 110 mmol/L Final     CO2   Date Value Ref Range Status   04/14/2024 22 (L) 23 - 29 mmol/L Final     Glucose   Date Value Ref Range Status   04/14/2024 280 (H) 70 - 110 mg/dL Final     BUN   Date Value Ref Range Status   04/14/2024 24 (H) 8 - 23 mg/dL Final     Creatinine   Date Value Ref Range Status   04/14/2024 1.1 0.5 - 1.4 mg/dL Final     Calcium   Date Value Ref Range Status   04/14/2024 8.7 8.7 - 10.5 mg/dL Final     Total Protein   Date Value Ref Range Status   04/14/2024 5.6 (L) 6.0 - 8.4 g/dL Final     Albumin   Date Value Ref Range Status   04/14/2024 2.9 (L) 3.5 - 5.2 g/dL Final     Total Bilirubin   Date Value Ref Range Status   04/14/2024 1.8 (H) 0.1 - 1.0 mg/dL Final     Comment:     For infants and newborns, interpretation of results should be based  on gestational age, weight and in agreement with clinical  observations.    Premature Infant recommended reference ranges:  Up to 24 hours.............<8.0 mg/dL  Up to 48 hours............<12.0 mg/dL  3-5 days..................<15.0 mg/dL  6-29 days.................<15.0 mg/dL       Alkaline Phosphatase   Date Value Ref Range Status   04/14/2024 70 55 - 135 U/L Final     AST   Date Value Ref Range Status   04/14/2024 86 (H) 10 - 40 U/L Final     ALT   Date Value Ref Range Status   04/14/2024 108 (H) 10 - 44 U/L Final     Anion Gap   Date Value Ref Range Status   04/14/2024 12 8 - 16 mmol/L Final     eGFR   Date Value Ref Range Status   04/14/2024 >60 >60 mL/min/1.73 m^2  Final         Imaging:  CT scan was independently visualized and reviewed by me and showed pancreatic lesion.      Assessment:   Gastric volvulus  HH  Pancreatic lesion    Plan:  Diet per surgery  OK to discharge home from GI standpoint once cleared by surgery  Outpatient CT in 2 weeks with pancreatic protocol to better characterize lesion  Follow up in office in 3 weeks to discuss further GI management.  Will sign off for now.  Call for questions.    Nilesh Woodard MD  [facility]  [address], [address]  [place] [address]  Office: [phone]  Fax: [phone]

## 2024-04-14 NOTE — ANESTHESIA POSTPROCEDURE EVALUATION
Anesthesia Post Evaluation    Patient: Syd Butterfield Jr.    Procedure(s) Performed: Procedure(s) (LRB):  XI ROBOTIC REPAIR, HERNIA, HIATAL, WITH FUNDOPLICATION (N/A)  XI ROBOTIC GASTROPEXY (N/A)    Final Anesthesia Type: general      Patient location during evaluation: PACU  Patient participation: Yes- Able to Participate  Level of consciousness: sedated and awake  Post-procedure vital signs: reviewed and stable  Pain management: adequate  Airway patency: patent    PONV status at discharge: No PONV  Anesthetic complications: no      Cardiovascular status: hypertensive, blood pressure returned to baseline and hemodynamically stable  Respiratory status: spontaneous ventilation  Hydration status: euvolemic  Follow-up not needed.              Vitals Value Taken Time   /76 04/14/24 0809   Temp 36.6 °C (97.9 °F) 04/14/24 0809   Pulse 78 04/14/24 0816   Resp 16 04/14/24 0816   SpO2 98 % 04/14/24 0816         Event Time   Out of Recovery 04/13/2024 16:55:00         Pain/Isamar Score: Isamar Score: 10 (4/13/2024  4:30 PM)

## 2024-04-14 NOTE — DISCHARGE INSTRUCTIONS
Eat full liquid diet for at least a week then go to soft diet per Dr Pryor instructions, Monitor incision sites for redness swelling or drainage if these occur go to nearest emergency room if you have jesús alternate tylenol and ibuprofen if you can take these, if fever unrelieved go to nearest emergency room, If you have increased abdominal pain nausea/vomiting or bloody stool go to nearest emergency room if you increased constipation go to nearest emergency room.

## 2024-04-15 ENCOUNTER — TELEPHONE (OUTPATIENT)
Dept: SURGERY | Facility: CLINIC | Age: 84
End: 2024-04-15
Payer: OTHER GOVERNMENT

## 2024-04-15 NOTE — TELEPHONE ENCOUNTER
Placed call to patient to advise that provider would like to schedule a CT and follow up appointment. Advised that [facility] would [facility] to [facility] to get scheduled.

## 2024-04-15 NOTE — TELEPHONE ENCOUNTER
Attempted to contact patient to schedule an appointment on Thursday this week with Dr Pryor.  Left message to call back

## 2024-04-16 LAB
BACTERIA BLD CULT: NORMAL
BACTERIA BLD CULT: NORMAL

## 2024-04-18 ENCOUNTER — OFFICE VISIT (OUTPATIENT)
Dept: SURGERY | Facility: CLINIC | Age: 84
End: 2024-04-18
Payer: OTHER GOVERNMENT

## 2024-04-18 VITALS — DIASTOLIC BLOOD PRESSURE: 64 MMHG | SYSTOLIC BLOOD PRESSURE: 125 MMHG | HEART RATE: 92 BPM | TEMPERATURE: 99 F

## 2024-04-18 DIAGNOSIS — Z98.890 POST-OPERATIVE STATE: Primary | ICD-10-CM

## 2024-04-18 PROCEDURE — 99024 POSTOP FOLLOW-UP VISIT: CPT | Mod: ,,, | Performed by: STUDENT IN AN ORGANIZED HEALTH CARE EDUCATION/TRAINING PROGRAM

## 2024-04-18 PROCEDURE — 99213 OFFICE O/P EST LOW 20 MIN: CPT | Mod: PBBFAC,PN | Performed by: STUDENT IN AN ORGANIZED HEALTH CARE EDUCATION/TRAINING PROGRAM

## 2024-04-18 PROCEDURE — 99999 PR PBB SHADOW E&M-EST. PATIENT-LVL III: CPT | Mod: PBBFAC,,, | Performed by: STUDENT IN AN ORGANIZED HEALTH CARE EDUCATION/TRAINING PROGRAM

## 2024-04-18 NOTE — PROGRESS NOTES
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  Doing reasonably well.  Tolerating liquids.      Vitals are stable   Afebrile  Abdomen soft     Pathology:  Benign     Overall, seems to be recovering slowly.  A pancreatic protocol CT has already been ordered to further evaluate a pancreatic mass.  Will place a referral to Dr. Cruz for consideration of EUS and FNA pending CT imaging.

## 2024-04-19 ENCOUNTER — PATIENT OUTREACH (OUTPATIENT)
Dept: ADMINISTRATIVE | Facility: CLINIC | Age: 84
End: 2024-04-19
Payer: OTHER GOVERNMENT

## 2024-04-19 ENCOUNTER — PATIENT MESSAGE (OUTPATIENT)
Dept: ADMINISTRATIVE | Facility: CLINIC | Age: 84
End: 2024-04-19
Payer: OTHER GOVERNMENT

## 2024-04-19 NOTE — PROGRESS NOTES
C3 nurse attempted to contact Syd Butterfield Jr. for a TCC post hospital discharge follow up call. No answer. Left message on voicemail to return call to Girish @ [phone] with callback info. The patient does not have a scheduled HOSFU appointment, and the pt does not have an [facility] PCP.

## 2024-04-19 NOTE — PROGRESS NOTES
C3 nurse spoke with Syd Butterfield Jr. for a TCC post hospital discharge follow up call. The patient reports does not have a scheduled HOSFU appointment. C3 nurse was unable to schedule [facility] appointment [facility]. Patient advised to contact their PCP to schedule a HOSPFU within 5-7 days.

## 2024-04-22 ENCOUNTER — TELEPHONE (OUTPATIENT)
Dept: GASTROENTEROLOGY | Facility: CLINIC | Age: 84
End: 2024-04-22
Payer: OTHER GOVERNMENT

## 2024-04-22 NOTE — TELEPHONE ENCOUNTER
Returned call to patient to assist. Advise patient that we have not receievd his RFS form for an appointment. He states [facility] today.

## 2024-04-22 NOTE — TELEPHONE ENCOUNTER
----- Message from Akilah Encinas sent at 4/22/2024 10:59 AM CDT -----  Type: Needs Medical Advice  Who Called:  Fabi[facility] with [facility]  Best Call Back Number: [phone]  Additional Information: patient is stating he has a ct scheduled on Wednesday, need an RFS form filled out and sent back, fax# [phone], please contact her asap

## 2024-04-22 NOTE — TELEPHONE ENCOUNTER
----- Message from Alexsandraantonio Leah sent at 4/22/2024  9:56 AM CDT -----  Regarding: Appt Request  Contact: pt daughter  Appointment Request      Caller is requesting an appointment.      Name of Caller: Yudith     Symptoms: Ct Follow for Pancreatic lesion [K86.9]    Would the patient rather a call back or a response via [facility]?  Call back    Best Call Back Number: [phone] or [phone] (daughter)       Additional Information: Sts has ct scan scheduled for 4/24 and is wanting a call from the office to follow up and go over those results.  Please Advise - Thank you

## 2024-04-25 ENCOUNTER — TELEPHONE (OUTPATIENT)
Dept: SURGERY | Facility: CLINIC | Age: 84
End: 2024-04-25
Payer: OTHER GOVERNMENT

## 2024-04-25 ENCOUNTER — TELEPHONE (OUTPATIENT)
Dept: GASTROENTEROLOGY | Facility: CLINIC | Age: 84
End: 2024-04-25
Payer: OTHER GOVERNMENT

## 2024-04-25 RX ORDER — DIPHENHYDRAMINE HCL 50 MG
50 CAPSULE ORAL
Qty: 1 CAPSULE | Refills: 0 | Status: SHIPPED | OUTPATIENT
Start: 2024-04-25

## 2024-04-25 RX ORDER — PREDNISONE 50 MG/1
50 TABLET ORAL
Qty: 3 TABLET | Refills: 0 | Status: SHIPPED | OUTPATIENT
Start: 2024-04-25 | End: 2024-04-26

## 2024-04-25 NOTE — TELEPHONE ENCOUNTER
Spoke to patient, advised that he does not need to see Dr Pryor in follow up.  He needs a referral to Dr Cuba for possible EUS and FNA of pancreatic mass, after he has the CT scan done.  [facility] is [facility]

## 2024-04-25 NOTE — TELEPHONE ENCOUNTER
----- Message from Juancarlos Lin sent at 4/25/2024  3:00 PM CDT -----  Type: Needs Medical Advice  Who Called:  pt  Pharmacy name and phone #:    Best Call Back Number: [phone]  Additional Information: pt is calling the office for Leslie Quinteros. He said he was returning her call about when  to see Dr. Monzon again . Please call back to advise. Thanks

## 2024-04-26 ENCOUNTER — PATIENT MESSAGE (OUTPATIENT)
Dept: GASTROENTEROLOGY | Facility: CLINIC | Age: 84
End: 2024-04-26
Payer: OTHER GOVERNMENT

## 2024-05-01 LAB
OHS QRS DURATION: 66 MS
OHS QTC CALCULATION: 423 MS

## 2024-05-02 LAB
OHS QRS DURATION: 90 MS
OHS QTC CALCULATION: 472 MS

## 2024-05-03 ENCOUNTER — HOSPITAL ENCOUNTER (OUTPATIENT)
Dept: RADIOLOGY | Facility: HOSPITAL | Age: 84
Discharge: HOME | End: 2024-05-03
Attending: INTERNAL MEDICINE
Payer: OTHER GOVERNMENT

## 2024-05-03 DIAGNOSIS — K86.9 PANCREATIC LESION: ICD-10-CM

## 2024-05-03 PROCEDURE — 74177 CT ABD & PELVIS W/CONTRAST: CPT | Mod: 26,,, | Performed by: RADIOLOGY

## 2024-05-03 PROCEDURE — 74177 CT ABD & PELVIS W/CONTRAST: CPT | Mod: TC

## 2024-05-03 PROCEDURE — 25500020 PHARM REV CODE 255

## 2024-05-03 RX ADMIN — IOHEXOL 75 ML: 350 INJECTION, SOLUTION INTRAVENOUS at 03:05

## 2024-05-05 NOTE — PROGRESS NOTES
Please advise patient that CT scan showed 2 cystic pancreatic lesions which appear to be benign.  Further characterization is recommended with an endoscopic ultrasound.  Please refer to [facility] for this.

## 2024-05-07 DIAGNOSIS — K86.9 PANCREATIC LESION: Primary | ICD-10-CM

## (undated) DEVICE — TROCAR ENDO Z THREAD KII 5X100

## (undated) DEVICE — COVER TIP CURVED SCISSORS XI

## (undated) DEVICE — DRAIN PENRS SIL STRL .25X18IN

## (undated) DEVICE — SUT ETHIBOND EX 0SH 30IN GR

## (undated) DEVICE — DRAPE ABDOMINAL TIBURON 14X11

## (undated) DEVICE — OBTURATOR BLADELESS 8MM XI CLR

## (undated) DEVICE — SLEEVE SCD EXPRESS KNEE MEDIUM

## (undated) DEVICE — BLADE SURG CARBON STEEL SZ11

## (undated) DEVICE — SUT BLU GS-22 0 3.5M 23CM 9IN

## (undated) DEVICE — DRAPE COLUMN DAVINCI XI

## (undated) DEVICE — GOWN POLY REINF X-LONG XL

## (undated) DEVICE — TOWEL OR DISP STRL BLUE 4/PK

## (undated) DEVICE — ADHESIVE DERMABOND ADVANCED

## (undated) DEVICE — SOL ELECTROLUBE ANTI-STIC

## (undated) DEVICE — SET TUBE PNEUMOCLEAR SE HI FLO

## (undated) DEVICE — BAG TISSUE RETRIEVAL 5MM

## (undated) DEVICE — SEALER VESSEL EXTEND

## (undated) DEVICE — ELECTRODE REM PLYHSV RETURN 9

## (undated) DEVICE — SOL NACL IRR 1000ML BTL

## (undated) DEVICE — PACK CUSTOM UNIV BASIN SLI

## (undated) DEVICE — GOWN POLY REINF BRTH SLV LG

## (undated) DEVICE — GOWN POLY REINF BRTH SLV XL

## (undated) DEVICE — SUT MONOCRYL 4-0 PS-2

## (undated) DEVICE — PACK SIRUS BASIC V SURG STRL

## (undated) DEVICE — SYR ONLY LUER LOCK 20CC

## (undated) DEVICE — GLOVE SENSICARE PI ALOE 7.5

## (undated) DEVICE — DRAPE ARM DAVINCI XI

## (undated) DEVICE — APPLICATOR CHLORAPREP ORN 26ML

## (undated) DEVICE — LINER SUCTION 3000CC

## (undated) DEVICE — STRAP OR TABLE 5IN X 72IN

## (undated) DEVICE — SOL CLEARIFY VISUALIZATION LAP

## (undated) DEVICE — NDL SAFETY 21G X 1 1/2 ECLPSE

## (undated) DEVICE — SEAL UNIVERSAL 5MM-8MM XI